# Patient Record
Sex: MALE | Race: WHITE | NOT HISPANIC OR LATINO | Employment: FULL TIME | URBAN - METROPOLITAN AREA
[De-identification: names, ages, dates, MRNs, and addresses within clinical notes are randomized per-mention and may not be internally consistent; named-entity substitution may affect disease eponyms.]

---
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
## 2022-01-16 ENCOUNTER — HEALTH MAINTENANCE LETTER (OUTPATIENT)
Age: 22
End: 2022-01-16

## 2022-02-07 ENCOUNTER — HOSPITAL ENCOUNTER (EMERGENCY)
Facility: CLINIC | Age: 22
Discharge: HOME | End: 2022-02-07
Attending: EMERGENCY MEDICINE | Admitting: EMERGENCY MEDICINE
Payer: COMMERCIAL

## 2022-02-07 ENCOUNTER — APPOINTMENT (OUTPATIENT)
Dept: CT IMAGING | Facility: CLINIC | Age: 22
End: 2022-02-07
Attending: EMERGENCY MEDICINE
Payer: COMMERCIAL

## 2022-02-07 VITALS
RESPIRATION RATE: 16 BRPM | BODY MASS INDEX: 16.92 KG/M2 | WEIGHT: 108 LBS | SYSTOLIC BLOOD PRESSURE: 116 MMHG | OXYGEN SATURATION: 97 % | HEART RATE: 60 BPM | TEMPERATURE: 98.4 F | DIASTOLIC BLOOD PRESSURE: 72 MMHG

## 2022-02-07 DIAGNOSIS — S16.1XXA STRAIN OF NECK MUSCLE, INITIAL ENCOUNTER: ICD-10-CM
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
## 2022-02-07 PROCEDURE — 250N000011 HC RX IP 250 OP 636: Performed by: EMERGENCY MEDICINE

## 2022-02-07 PROCEDURE — 99285 EMERGENCY DEPT VISIT HI MDM: CPT | Performed by: EMERGENCY MEDICINE

## 2022-02-07 PROCEDURE — 72125 CT NECK SPINE W/O DYE: CPT

## 2022-02-07 PROCEDURE — 96374 THER/PROPH/DIAG INJ IV PUSH: CPT | Performed by: EMERGENCY MEDICINE

## 2022-02-07 PROCEDURE — 96376 TX/PRO/DX INJ SAME DRUG ADON: CPT | Performed by: EMERGENCY MEDICINE

## 2022-02-07 PROCEDURE — 96375 TX/PRO/DX INJ NEW DRUG ADDON: CPT | Performed by: EMERGENCY MEDICINE

## 2022-02-07 PROCEDURE — 99285 EMERGENCY DEPT VISIT HI MDM: CPT | Mod: 25 | Performed by: EMERGENCY MEDICINE

## 2022-02-07 RX ORDER — LORAZEPAM 2 MG/ML
0.5 INJECTION INTRAMUSCULAR ONCE
Status: COMPLETED | OUTPATIENT
Start: 2022-02-07 | End: 2022-02-07

## 2022-02-07 RX ORDER — HYDROCODONE BITARTRATE AND ACETAMINOPHEN 5; 325 MG/1; MG/1
1 TABLET ORAL EVERY 6 HOURS PRN
Qty: 10 TABLET | Refills: 0 | Status: SHIPPED | OUTPATIENT
Start: 2022-02-07 | End: 2022-02-10

## 2022-02-07 RX ORDER — HYDROMORPHONE HYDROCHLORIDE 1 MG/ML
0.5 INJECTION, SOLUTION INTRAMUSCULAR; INTRAVENOUS; SUBCUTANEOUS ONCE
Status: COMPLETED | OUTPATIENT
Start: 2022-02-07 | End: 2022-02-07

## 2022-02-07 RX ORDER — KETOROLAC TROMETHAMINE 15 MG/ML
15 INJECTION, SOLUTION INTRAMUSCULAR; INTRAVENOUS ONCE
Status: COMPLETED | OUTPATIENT
Start: 2022-02-07 | End: 2022-02-07

## 2022-02-07 RX ORDER — CYCLOBENZAPRINE HCL 10 MG
5-10 TABLET ORAL 3 TIMES DAILY PRN
Qty: 30 TABLET | Refills: 0 | Status: SHIPPED | OUTPATIENT
Start: 2022-02-07 | End: 2022-09-15

## 2022-02-07 RX ORDER — IBUPROFEN 200 MG
600 TABLET ORAL DAILY
COMMUNITY
End: 2022-09-15

## 2022-02-07 RX ORDER — ACETAMINOPHEN 500 MG
1000 TABLET ORAL 3 TIMES DAILY PRN
COMMUNITY

## 2022-02-07 RX ADMIN — LORAZEPAM 0.5 MG: 2 INJECTION INTRAMUSCULAR; INTRAVENOUS at 18:09

## 2022-02-07 RX ADMIN — KETOROLAC TROMETHAMINE 15 MG: 15 INJECTION, SOLUTION INTRAMUSCULAR; INTRAVENOUS at 17:37

## 2022-02-07 RX ADMIN — LORAZEPAM 0.5 MG: 2 INJECTION INTRAMUSCULAR; INTRAVENOUS at 17:39

## 2022-02-07 RX ADMIN — HYDROMORPHONE HYDROCHLORIDE 0.5 MG: 1 INJECTION, SOLUTION INTRAMUSCULAR; INTRAVENOUS; SUBCUTANEOUS at 18:07
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
## 2022-02-07 NOTE — ED TRIAGE NOTES
Stiffneck collar applied at triage.  Tingling to fingers on left hand before and after application.
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
## 2022-02-07 NOTE — LETTER
February 7, 2022      To Whom It May Concern:      Cathi Gu was seen in our Emergency Department today, 02/07/22.  I expect his condition to improve over the next 2-3 days.  He may return to work/school when improved.    Sincerely,        Coreen Gaona MD

## 2022-02-08 NOTE — DISCHARGE INSTRUCTIONS
CT scan was normal.  This means there are no broken bones.    Recommend rest, ice or heat to the painful area.    Ibuprofen or Aleve for pain and inflammation.    Flexeril if needed for muscle spasm.  No driving while on this medication.    I will give you a small amount of pain medications to use if needed for severe pain.  It can cause constipation so take stool softener as needed.  No driving while on this medication.    Follow-up in clinic for recheck later in the week.  If it is not improving you may need some physical therapy.  Return for significant worsening or changes or concerns.    I hope you heal quickly!!
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
## 2022-03-08 ENCOUNTER — OFFICE VISIT (OUTPATIENT)
Dept: FAMILY MEDICINE | Facility: CLINIC | Age: 22
End: 2022-03-08
Payer: COMMERCIAL

## 2022-03-08 VITALS
DIASTOLIC BLOOD PRESSURE: 60 MMHG | WEIGHT: 118.25 LBS | HEIGHT: 66 IN | HEART RATE: 120 BPM | BODY MASS INDEX: 19.01 KG/M2 | RESPIRATION RATE: 18 BRPM | OXYGEN SATURATION: 99 % | SYSTOLIC BLOOD PRESSURE: 110 MMHG | TEMPERATURE: 98.8 F

## 2022-03-08 DIAGNOSIS — M54.2 ACUTE NECK PAIN: ICD-10-CM

## 2022-03-08 DIAGNOSIS — M54.6 ACUTE BILATERAL THORACIC BACK PAIN: ICD-10-CM

## 2022-03-08 DIAGNOSIS — M54.42 ACUTE BILATERAL LOW BACK PAIN WITH BILATERAL SCIATICA: Primary | ICD-10-CM

## 2022-03-08 DIAGNOSIS — M54.41 ACUTE BILATERAL LOW BACK PAIN WITH BILATERAL SCIATICA: Primary | ICD-10-CM

## 2022-03-08 PROCEDURE — 99214 OFFICE O/P EST MOD 30 MIN: CPT | Performed by: PHYSICIAN ASSISTANT

## 2022-03-08 PROCEDURE — 96127 BRIEF EMOTIONAL/BEHAV ASSMT: CPT | Performed by: PHYSICIAN ASSISTANT

## 2022-03-08 RX ORDER — CYCLOBENZAPRINE HCL 10 MG
10 TABLET ORAL 3 TIMES DAILY PRN
Qty: 20 TABLET | Refills: 0 | Status: SHIPPED | OUTPATIENT
Start: 2022-03-08 | End: 2022-09-15

## 2022-03-08 RX ORDER — PREDNISONE 20 MG/1
40 TABLET ORAL DAILY
Qty: 10 TABLET | Refills: 0 | Status: SHIPPED | OUTPATIENT
Start: 2022-03-08 | End: 2022-03-13

## 2022-03-08 ASSESSMENT — ANXIETY QUESTIONNAIRES
6. BECOMING EASILY ANNOYED OR IRRITABLE: NOT AT ALL
7. FEELING AFRAID AS IF SOMETHING AWFUL MIGHT HAPPEN: NOT AT ALL
GAD7 TOTAL SCORE: 3
4. TROUBLE RELAXING: SEVERAL DAYS
5. BEING SO RESTLESS THAT IT IS HARD TO SIT STILL: NOT AT ALL
GAD7 TOTAL SCORE: 3
1. FEELING NERVOUS, ANXIOUS, OR ON EDGE: NOT AT ALL
7. FEELING AFRAID AS IF SOMETHING AWFUL MIGHT HAPPEN: NOT AT ALL
GAD7 TOTAL SCORE: 3
3. WORRYING TOO MUCH ABOUT DIFFERENT THINGS: SEVERAL DAYS
2. NOT BEING ABLE TO STOP OR CONTROL WORRYING: SEVERAL DAYS

## 2022-03-08 ASSESSMENT — PATIENT HEALTH QUESTIONNAIRE - PHQ9
SUM OF ALL RESPONSES TO PHQ QUESTIONS 1-9: 1
SUM OF ALL RESPONSES TO PHQ QUESTIONS 1-9: 1
10. IF YOU CHECKED OFF ANY PROBLEMS, HOW DIFFICULT HAVE THESE PROBLEMS MADE IT FOR YOU TO DO YOUR WORK, TAKE CARE OF THINGS AT HOME, OR GET ALONG WITH OTHER PEOPLE: SOMEWHAT DIFFICULT

## 2022-03-08 ASSESSMENT — PAIN SCALES - GENERAL: PAINLEVEL: EXTREME PAIN (9)

## 2022-03-08 NOTE — PATIENT INSTRUCTIONS
Range of motion exercises discussed  Physical therapy referral order placed  Take muscle relaxant every 8 hours as needed for pain/muscle spasms.   CAUTION: Muscle relaxants are sedating. Do not take before work or driving a vehicle.  Take anti-inflammatory medication, such as naproxen or ibuprofen to help with inflammation.   For acute pain, rest, intermittent application of heat (do not sleep on heating pad) and ice for pain reduction.  Do not rest for more than 48 hours after injury/onset of pain.  Gentle stretching and light activity is important.   Stretch back with range of motion exercises.  Proper lifting with avoidance of heavy lifting discussed.   Follow up with primary care provider if these symptoms worsen or fail to improve as anticipated.
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
## 2022-03-09 ASSESSMENT — PATIENT HEALTH QUESTIONNAIRE - PHQ9: SUM OF ALL RESPONSES TO PHQ QUESTIONS 1-9: 1

## 2022-03-09 ASSESSMENT — ANXIETY QUESTIONNAIRES: GAD7 TOTAL SCORE: 3

## 2022-04-26 ENCOUNTER — HOSPITAL ENCOUNTER (EMERGENCY)
Facility: CLINIC | Age: 22
Discharge: HOME | End: 2022-04-26
Attending: EMERGENCY MEDICINE | Admitting: EMERGENCY MEDICINE

## 2022-04-26 VITALS
WEIGHT: 115 LBS | HEART RATE: 60 BPM | RESPIRATION RATE: 19 BRPM | DIASTOLIC BLOOD PRESSURE: 62 MMHG | TEMPERATURE: 97.5 F | OXYGEN SATURATION: 99 % | SYSTOLIC BLOOD PRESSURE: 103 MMHG | BODY MASS INDEX: 18.56 KG/M2

## 2022-04-26 DIAGNOSIS — M54.50 ACUTE BILATERAL LOW BACK PAIN WITHOUT SCIATICA: ICD-10-CM

## 2022-04-26 PROCEDURE — 99284 EMERGENCY DEPT VISIT MOD MDM: CPT | Performed by: EMERGENCY MEDICINE

## 2022-04-26 RX ORDER — OXYCODONE HYDROCHLORIDE 5 MG/1
5 TABLET ORAL EVERY 6 HOURS PRN
Qty: 12 TABLET | Refills: 0 | Status: SHIPPED | OUTPATIENT
Start: 2022-04-26 | End: 2022-04-29

## 2022-04-26 RX ORDER — TIZANIDINE 2 MG/1
2 TABLET ORAL 3 TIMES DAILY PRN
Qty: 15 TABLET | Refills: 0 | Status: SHIPPED | OUTPATIENT
Start: 2022-04-26 | End: 2022-09-15

## 2022-04-26 RX ORDER — ONDANSETRON 4 MG/1
4 TABLET, ORALLY DISINTEGRATING ORAL EVERY 6 HOURS PRN
Qty: 15 TABLET | Refills: 0 | Status: SHIPPED | OUTPATIENT
Start: 2022-04-26 | End: 2022-04-29

## 2022-04-26 NOTE — LETTER
April 26, 2022      To Whom It May Concern:      Cathi Gu was seen in our Emergency Department today, 04/26/22.  I expect his condition to improve over the next 7 days.  He may return to work when improved but must not do any heavy lifting until back pain resolved.    Sincerely,        Maurice Schaeffer MD

## 2022-04-26 NOTE — ED TRIAGE NOTES
Patient states he injured his back while lifting at work on Friday. He is also c/o nausea and vomiting that started after eating [employer] today. Color is pale.      Triage Assessment     Row Name 04/26/22 1848       Triage Assessment (Adult)    Airway WDL WDL       Skin Circulation/Temperature WDL    Skin Circulation/Temperature WDL X;all    Skin Circulation pallor    Skin Temperature cool

## 2022-04-27 ENCOUNTER — PATIENT OUTREACH (OUTPATIENT)
Dept: CARE COORDINATION | Facility: CLINIC | Age: 22
End: 2022-04-27
Payer: COMMERCIAL

## 2022-04-27 DIAGNOSIS — Z71.89 OTHER SPECIFIED COUNSELING: ICD-10-CM

## 2022-04-27 NOTE — PROGRESS NOTES
Clinic Care Coordination Contact  [facility]/Voicemail       Clinical Data: Care Coordinator Outreach  Outreach attempted x 1.  Left message on patient's voicemail with call back information and requested return call.  Plan: Care Coordinator will try to reach patient again in 1-2 business days.    .Kimmie OVIEDO Community Health Worker  Clinic Care Coordination  [facility]  Phone: [phone]

## 2022-04-27 NOTE — DISCHARGE INSTRUCTIONS
Return to the emergency department if pain out-of-control or other new symptoms develop such as numbness down the legs.  Do not lift anything heavy for the next several days to a week.  Return to activity as tolerated as your pain improves.  Do the stretching exercises I recommended.  I hope you get better quickly.  No driving if taking the pain pills.

## 2022-04-27 NOTE — ED PROVIDER NOTES
History     Chief Complaint   Patient presents with     Back Pain     HPI  Cathi Gu is a 21 year old male who presents to the emergency department secondary to back pain, nausea and vomiting.  He has a history of von Willebrand's disease.  He developed the back pain a few days ago after lifting a heavy piece of metal.  The pain is located in the bilateral paralumbar musculature.  It does not radiate.  It makes it worse to twist and turn and bend at the waist.  There is no high-speed accident.  His back seems to be in muscle spasm.  No numbness or tingling.  He also developed nausea and vomiting today that he feels was related to the pain but may be related to the [employer] he ate earlier today.  He does a lot of lifting at work and he is unable to tolerate this with the pain.  He has tried Tylenol and ibuprofen.  He has not been able to get relief.  He tried to go back to work but had difficulty because of the requirement to do lifting at work.    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 positive 09/02/2016     Priority: Medium     NSAID long-term use 09/02/2016     Priority: Medium     DAVIAN (generalized anxiety disorder) 05/03/2016     Priority: Medium     Adjustment disorder with depressed mood 05/03/2016     Priority: Medium     Intermittent asthma, uncomplicated 03/11/2016     Priority: Medium     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     Priority:  Medium     Seasonal allergic rhinitis 10/29/2013     Priority: Medium     Learning disability 2011     Priority: Medium        Past Medical History:    Past Medical History:   Diagnosis Date     Asthma, intermittent      Cyclical vomiting age 8y     Learning disability      Von Willebrand disease (H)        Past Surgical History:    Past Surgical History:   Procedure Laterality Date     BIOPSY OF SKIN LESION      mole removal     ESOPHAGOSCOPY, GASTROSCOPY, DUODENOSCOPY (EGD), COMBINED  2014    Procedure: COMBINED ESOPHAGOSCOPY, GASTROSCOPY, DUODENOSCOPY (EGD), BIOPSY SINGLE OR MULTIPLE;  EGD, vomiting;  Surgeon: Leo Chapman MD;  Location: [facility] OR     MYRINGOTOMY, INSERT TUBE BILATERAL, COMBINED Bilateral 2018    Procedure: COMBINED MYRINGOTOMY, INSERT TUBE BILATERAL;  Bilateral myringotomy with tubes;  Surgeon: Rick Watson MD;  Location: [facility] OR     PE tubes in B ears x 2      tubes out     pyloric stenosis         Family History:    Family History   Problem Relation Age of Onset     Bipolar Disorder Other      Schizophrenia Other      Bipolar Disorder Maternal Aunt      Bipolar Disorder Maternal Grandmother      Diabetes Other         Maternal great grandfather     Other - See Comments Other         Lupus-- paternal side half brothers     Other - See Comments Other         paternal side half brother,  congenital syndrome at age 1y     Other - See Comments Mother         mother 5 ft 1in with menarche at age 15 years;  mother is adopted, her biological parents were related (parent-child)/Concern for genetic syndrome, never worked up fully. Born with 3 toes on each foot.     Other - See Comments Father         unsure height, 5 feet 7 in est     Ulcerative Colitis Other         Maternal great grandmother       Social History:  Marital Status:  Single [1]  Social History     Tobacco Use     Smoking status: Current Some Day Smoker     Packs/day: 0.25     Smokeless tobacco: Current User      Types: Chew   Vaping Use     Vaping Use: Every day     Substances: Nicotine, Flavoring     Devices: Pre-filled pod   Substance Use Topics     Alcohol use: Yes     Alcohol/week: 0.0 standard drinks     Comment: occ     Drug use: Yes     Types: Marijuana        Medications:    ondansetron (ZOFRAN ODT) 4 MG ODT tab  oxyCODONE (ROXICODONE) 5 MG tablet  tiZANidine (ZANAFLEX) 2 MG tablet  acetaminophen (TYLENOL) 500 MG tablet  albuterol (PROAIR HFA/PROVENTIL HFA/VENTOLIN HFA) 108 (90 Base) MCG/ACT inhaler  cyclobenzaprine (FLEXERIL) 10 MG tablet  cyclobenzaprine (FLEXERIL) 10 MG tablet  fluticasone (FLONASE) 50 MCG/ACT nasal spray  ibuprofen (ADVIL/MOTRIN) 200 MG tablet          Review of Systems   All other systems reviewed and are negative.      Physical Exam   BP: (!) 137/104  Pulse: 51  Temp: 97.5  F (36.4  C)  Resp: 22  Weight: 52.2 kg (115 lb)  SpO2: 97 %      Physical Exam  Vitals and nursing note reviewed.   Constitutional:       General: He is not in acute distress.     Appearance: He is well-developed. He is not diaphoretic.   HENT:      Head: Normocephalic and atraumatic.      Right Ear: External ear normal.      Left Ear: External ear normal.      Nose: Nose normal.      Mouth/Throat:      Mouth: Mucous membranes are moist.   Eyes:      General: No scleral icterus.  Cardiovascular:      Rate and Rhythm: Normal rate.   Pulmonary:      Effort: Pulmonary effort is normal.   Musculoskeletal:      Cervical back: Normal range of motion and neck supple.      Comments: He is able to sit up at the waist.  He has tenderness to palpation over the paraspinal musculature in the lumbar spine area.  Minimal tenderness over the midline lumbar spine.  Pain is provoked by twisting turning and straight leg raise.  Normal sensation in extremities.  There is limited range of motion at the waist secondary to pain.   Skin:     General: Skin is warm and dry.      Findings: No rash.   Neurological:      Mental Status: He is alert and  oriented to person, place, and time.   Psychiatric:         Mood and Affect: Mood normal.         ED Course                 Procedures                  No results found for this or any previous visit (from the past 24 hour(s)).    Medications - No data to display    Assessments & Plan (with Medical Decision Making)  Low back pain, muscular.  No evidence for radicular symptoms or lumbar disc disease.  There is no high mechanism of injury that required imaging.  The patient is not able to tolerate working lifting and therefore a work note was given.  I given the option to do IV medications for his pain, muscle spasm and vomiting.  I am not sure if his vomiting is related to the pain or secondary to the meal that he ate.  He had 3 episodes of vomiting total.  Given his options he wanted to go home with prescriptions rather than receive an intramuscular shot.  Prescriptions were sent to the pharmacy for Zofran, tizanidine, oxycodone.  Patient advised not to drive on those medications.  Return to ER precautions of fall precautions discussed.  All questions answered prior to discharge.     I have reviewed the nursing notes.    I have reviewed the findings, diagnosis, plan and need for follow up with the patient.      New Prescriptions    ONDANSETRON (ZOFRAN ODT) 4 MG ODT TAB    Take 1 tablet (4 mg) by mouth every 6 hours as needed    OXYCODONE (ROXICODONE) 5 MG TABLET    Take 1 tablet (5 mg) by mouth every 6 hours as needed for pain    TIZANIDINE (ZANAFLEX) 2 MG TABLET    Take 1 tablet (2 mg) by mouth 3 times daily as needed for muscle spasms       Final diagnoses:   Acute bilateral low back pain without sciatica       4/26/2022   [facility] EMERGENCY DEPT     Maurice Schaeffer MD  04/26/22 1933

## 2022-06-08 ENCOUNTER — OFFICE VISIT (OUTPATIENT)
Dept: PEDIATRICS | Facility: CLINIC | Age: 22
End: 2022-06-08
Payer: COMMERCIAL

## 2022-06-08 VITALS
SYSTOLIC BLOOD PRESSURE: 116 MMHG | RESPIRATION RATE: 16 BRPM | DIASTOLIC BLOOD PRESSURE: 80 MMHG | TEMPERATURE: 97.6 F | HEIGHT: 66 IN | WEIGHT: 113.8 LBS | HEART RATE: 66 BPM | OXYGEN SATURATION: 100 % | BODY MASS INDEX: 18.29 KG/M2

## 2022-06-08 DIAGNOSIS — Z11.3 SCREENING EXAMINATION FOR STD (SEXUALLY TRANSMITTED DISEASE): Primary | ICD-10-CM

## 2022-06-08 PROCEDURE — 86780 TREPONEMA PALLIDUM: CPT | Performed by: PEDIATRICS

## 2022-06-08 PROCEDURE — 87491 CHLMYD TRACH DNA AMP PROBE: CPT | Performed by: PEDIATRICS

## 2022-06-08 PROCEDURE — 36415 COLL VENOUS BLD VENIPUNCTURE: CPT | Performed by: PEDIATRICS

## 2022-06-08 PROCEDURE — 87389 HIV-1 AG W/HIV-1&-2 AB AG IA: CPT | Performed by: PEDIATRICS

## 2022-06-08 PROCEDURE — 87591 N.GONORRHOEAE DNA AMP PROB: CPT | Performed by: PEDIATRICS

## 2022-06-08 PROCEDURE — 99213 OFFICE O/P EST LOW 20 MIN: CPT | Performed by: PEDIATRICS

## 2022-06-08 ASSESSMENT — ASTHMA QUESTIONNAIRES: ACT_TOTALSCORE: 22

## 2022-06-08 ASSESSMENT — PAIN SCALES - GENERAL: PAINLEVEL: NO PAIN (0)

## 2022-06-08 NOTE — PROGRESS NOTES
"  Assessment & Plan     (Z11.3) Screening examination for STD (sexually transmitted disease)  (primary encounter diagnosis)  Plan: HIV Antigen Antibody Combo, Treponema Abs w         Reflex to [facility]PR and Titer, Chlamydia & Gonorrhea         by PCR, [facility] - Clinic Collect                   Tobacco Cessation:   reports that he has been smoking. He has been smoking about 1.00 pack per day. His smokeless tobacco use includes chew.      Muna Lara MD  [facility]   Cathi is a 21 year old who presents for the following health issues     History of Present Illness       Reason for visit:  Std testing    He eats 0-1 servings of fruits and vegetables daily.He consumes 4 sweetened beverage(s) daily.He exercises with enough effort to increase his heart rate 60 or more minutes per day.  He exercises with enough effort to increase his heart rate 5 days per week.   He is taking medications regularly.       Has about 5-6 female partners in his lifetime, occ condom use, has never gotten tested before, denies any symptoms - denies any rashes, no lesions on penis, no discharge, no joint pain, no pain with urination or with sex, last sexual encounter was today          Objective    /80   Pulse 66   Temp 97.6  F (36.4  C) (Tympanic)   Resp 16   Ht 5' 5.59\" (1.666 m)   Wt 113 lb 12.8 oz (51.6 kg)   SpO2 100%   BMI 18.60 kg/m    Body mass index is 18.6 kg/m .  Physical Exam   GENERAL: healthy, alert and no distress   (male): normal male genitalia without lesions or urethral discharge, no hernia            "

## 2022-06-09 LAB
C TRACH DNA SPEC QL PROBE+SIG AMP: NEGATIVE
HIV 1+2 AB+HIV1 P24 AG SERPL QL IA: NONREACTIVE
N GONORRHOEA DNA SPEC QL NAA+PROBE: NEGATIVE
T PALLIDUM AB SER QL: NONREACTIVE

## 2022-06-29 ENCOUNTER — TELEPHONE (OUTPATIENT)
Dept: PEDIATRICS | Facility: CLINIC | Age: 22
End: 2022-06-29

## 2022-06-29 NOTE — TELEPHONE ENCOUNTER
Patient Quality Outreach    Patient is due for the following:   Asthma  -  AAP  Physical  - due now   Immunizations  -  Covid and HPV    NEXT STEPS:   Schedule a yearly physical    Type of outreach:    Sent Joost message.      Questions for provider review:    None     Akila Carrillo MA

## 2022-07-09 NOTE — PATIENT INSTRUCTIONS
Please move hand for circulatory flow. May use ibuprofen or tylenol for discomfort. I will call with final read of xray    Thank you  Yocasta Go CNP    
LABS:                        13.6   10.94 )-----------( 260      ( 09 Jul 2022 11:39 )             41.2     07-09    137  |  100  |  26<H>  ----------------------------<  138<H>  4.0   |  24  |  0.99    Ca    10.1      09 Jul 2022 11:39    TPro  6.7  /  Alb  4.2  /  TBili  0.3  /  DBili  x   /  AST  30  /  ALT  26  /  AlkPhos  96  07-09    PT/INR - ( 09 Jul 2022 11:39 )   PT: 11.6 sec;   INR: 1.00 ratio         PTT - ( 09 Jul 2022 11:39 )  PTT:25.5 sec          RADIOLOGY & ADDITIONAL TESTS:

## 2022-09-15 ENCOUNTER — OFFICE VISIT (OUTPATIENT)
Dept: FAMILY MEDICINE | Facility: CLINIC | Age: 22
End: 2022-09-15
Payer: COMMERCIAL

## 2022-09-15 VITALS
WEIGHT: 115 LBS | HEIGHT: 66 IN | BODY MASS INDEX: 18.48 KG/M2 | RESPIRATION RATE: 18 BRPM | HEART RATE: 70 BPM | SYSTOLIC BLOOD PRESSURE: 100 MMHG | DIASTOLIC BLOOD PRESSURE: 60 MMHG | TEMPERATURE: 98.3 F

## 2022-09-15 DIAGNOSIS — H92.12 OTORRHEA, LEFT: Primary | ICD-10-CM

## 2022-09-15 DIAGNOSIS — J45.21 MILD INTERMITTENT ASTHMA WITH ACUTE EXACERBATION: ICD-10-CM

## 2022-09-15 DIAGNOSIS — F33.0 MILD RECURRENT MAJOR DEPRESSION (H): ICD-10-CM

## 2022-09-15 DIAGNOSIS — D68.00 VON WILLEBRAND'S DISEASE (H): ICD-10-CM

## 2022-09-15 PROCEDURE — 99214 OFFICE O/P EST MOD 30 MIN: CPT | Performed by: FAMILY MEDICINE

## 2022-09-15 RX ORDER — CEFDINIR 300 MG/1
300 CAPSULE ORAL 2 TIMES DAILY
Qty: 20 CAPSULE | Refills: 0 | Status: SHIPPED | OUTPATIENT
Start: 2022-09-15 | End: 2022-09-25

## 2022-09-15 RX ORDER — ALBUTEROL SULFATE 90 UG/1
2 AEROSOL, METERED RESPIRATORY (INHALATION) EVERY 4 HOURS PRN
Qty: 18 G | Refills: 3 | Status: SHIPPED | OUTPATIENT
Start: 2022-09-15

## 2022-09-15 ASSESSMENT — ASTHMA QUESTIONNAIRES
ACT_TOTALSCORE: 15
QUESTION_2 LAST FOUR WEEKS HOW OFTEN HAVE YOU HAD SHORTNESS OF BREATH: THREE TO SIX TIMES A WEEK
QUESTION_3 LAST FOUR WEEKS HOW OFTEN DID YOUR ASTHMA SYMPTOMS (WHEEZING, COUGHING, SHORTNESS OF BREATH, CHEST TIGHTNESS OR PAIN) WAKE YOU UP AT NIGHT OR EARLIER THAN USUAL IN THE MORNING: TWO OR THREE NIGHTS A WEEK
ACT_TOTALSCORE: 15
QUESTION_5 LAST FOUR WEEKS HOW WOULD YOU RATE YOUR ASTHMA CONTROL: SOMEWHAT CONTROLLED
QUESTION_1 LAST FOUR WEEKS HOW MUCH OF THE TIME DID YOUR ASTHMA KEEP YOU FROM GETTING AS MUCH DONE AT WORK, SCHOOL OR AT HOME: A LITTLE OF THE TIME
QUESTION_4 LAST FOUR WEEKS HOW OFTEN HAVE YOU USED YOUR RESCUE INHALER OR NEBULIZER MEDICATION (SUCH AS ALBUTEROL): TWO OR THREE TIMES PER WEEK

## 2022-09-15 ASSESSMENT — PATIENT HEALTH QUESTIONNAIRE - PHQ9
SUM OF ALL RESPONSES TO PHQ QUESTIONS 1-9: 6
10. IF YOU CHECKED OFF ANY PROBLEMS, HOW DIFFICULT HAVE THESE PROBLEMS MADE IT FOR YOU TO DO YOUR WORK, TAKE CARE OF THINGS AT HOME, OR GET ALONG WITH OTHER PEOPLE: SOMEWHAT DIFFICULT
SUM OF ALL RESPONSES TO PHQ QUESTIONS 1-9: 6

## 2022-09-15 ASSESSMENT — ANXIETY QUESTIONNAIRES
1. FEELING NERVOUS, ANXIOUS, OR ON EDGE: SEVERAL DAYS
7. FEELING AFRAID AS IF SOMETHING AWFUL MIGHT HAPPEN: NOT AT ALL
GAD7 TOTAL SCORE: 2
4. TROUBLE RELAXING: NOT AT ALL
5. BEING SO RESTLESS THAT IT IS HARD TO SIT STILL: NOT AT ALL
3. WORRYING TOO MUCH ABOUT DIFFERENT THINGS: NOT AT ALL
GAD7 TOTAL SCORE: 2
7. FEELING AFRAID AS IF SOMETHING AWFUL MIGHT HAPPEN: NOT AT ALL
8. IF YOU CHECKED OFF ANY PROBLEMS, HOW DIFFICULT HAVE THESE MADE IT FOR YOU TO DO YOUR WORK, TAKE CARE OF THINGS AT HOME, OR GET ALONG WITH OTHER PEOPLE?: SOMEWHAT DIFFICULT
GAD7 TOTAL SCORE: 2
6. BECOMING EASILY ANNOYED OR IRRITABLE: SEVERAL DAYS
IF YOU CHECKED OFF ANY PROBLEMS ON THIS QUESTIONNAIRE, HOW DIFFICULT HAVE THESE PROBLEMS MADE IT FOR YOU TO DO YOUR WORK, TAKE CARE OF THINGS AT HOME, OR GET ALONG WITH OTHER PEOPLE: SOMEWHAT DIFFICULT
2. NOT BEING ABLE TO STOP OR CONTROL WORRYING: NOT AT ALL

## 2022-09-15 ASSESSMENT — PAIN SCALES - GENERAL: PAINLEVEL: NO PAIN (0)

## 2022-09-15 NOTE — NURSING NOTE
"Chief Complaint   Patient presents with     Ear Problem     /60 (Cuff Size: Adult Regular)   Pulse 70   Temp 98.3  F (36.8  C) (Tympanic)   Resp 18   Ht 1.666 m (5' 5.59\")   Wt 52.2 kg (115 lb)   BMI 18.79 kg/m   Estimated body mass index is 18.79 kg/m  as calculated from the following:    Height as of this encounter: 1.666 m (5' 5.59\").    Weight as of this encounter: 52.2 kg (115 lb).  Patient presents to the clinic using No DME      Health Maintenance that is potentially due pending provider review:    Health Maintenance Due   Topic Date Due     NICOTINE/TOBACCO CESSATION COUNSELING Q 1 YR  Never done     ANNUAL REVIEW OF HM ORDERS  Never done     COVID-19 Vaccine (1) Never done     Pneumococcal Vaccine: Pediatrics (0 to 5 Years) and At-Risk Patients (6 to 64 Years) (1 - PCV) 09/08/2008     HPV IMMUNIZATION (1 - Male 2-dose series) Never done     PREVENTIVE CARE VISIT  02/18/2020     HEPATITIS C SCREENING  Never done     ASTHMA ACTION PLAN  01/21/2021     INFLUENZA VACCINE (1) 09/01/2022                "

## 2022-09-15 NOTE — PROGRESS NOTES
Assessment & Plan     Otorrhea, left  Differential discussed in detail including left-sided otitis media.  Omnicef prescribed, common side effects discussed.  Recommended well hydration, warm fluids, over-the-counter antihistamine and to follow-up in 2 weeks or earlier if needed.  - cefdinir (OMNICEF) 300 MG capsule; Take 1 capsule (300 mg) by mouth 2 times daily for 10 days    Mild intermittent asthma with acute exacerbation  Albuterol inhaler refilled  - albuterol (PROAIR HFA/PROVENTIL HFA/VENTOLIN HFA) 108 (90 Base) MCG/ACT inhaler; Inhale 2 puffs into the lungs every 4 hours as needed for shortness of breath / dyspnea or wheezing    Mild recurrent major depression (H)  Symptoms stable.  Recommended counseling  - Adult Mental Health  Referral; Future    Von Willebrand's disease (H)  History of von Willebrand disease.  No excessive bleeding episodes in the recent past      Nicotine/Tobacco Cessation:  He reports that he has been smoking. He has been smoking about 1.00 pack per day. His smokeless tobacco use includes chew.  Nicotine/Tobacco Cessation Plan:   Information offered: Patient not interested at this time      Sanford Denton MD  [facility]    Tabatha Winkler is a 22 year old, presenting for the following health issues:  Ear Problem      History of Present Illness       Reason for visit:  My ears    He eats 0-1 servings of fruits and vegetables daily.He consumes 2 sweetened beverage(s) daily.He exercises with enough effort to increase his heart rate 9 or less minutes per day.  He exercises with enough effort to increase his heart rate 3 or less days per week.   He is taking medications regularly.    Today's PHQ-9         PHQ-9 Total Score: 6    PHQ-9 Q9 Thoughts of better off dead/self-harm past 2 weeks :   Not at all    How difficult have these problems made it for you to do your work, take care of things at home, or get along with other people: Somewhat  "difficult  Today's DAVIAN-7 Score: 2       Left ear. Had tubes 3-4 years ago. Feels full of fluid.         Review of Systems   Constitutional, HEENT, cardiovascular, pulmonary, GI, , musculoskeletal, neuro, skin, endocrine and psych systems are negative, except as otherwise noted.      Objective    /60 (Cuff Size: Adult Regular)   Pulse 70   Temp 98.3  F (36.8  C) (Tympanic)   Resp 18   Ht 1.666 m (5' 5.59\")   Wt 52.2 kg (115 lb)   BMI 18.79 kg/m    Body mass index is 18.79 kg/m .  Physical Exam   GENERAL: healthy, alert and no distress  EYES: Eyes grossly normal to inspection, PERRL and conjunctivae and sclerae normal  HENT: normal cephalic/atraumatic, right ear: normal: no effusions, no erythema, normal landmarks, left ear: clear effusion and bulging membrane, nose and mouth without ulcers or lesions, oropharynx clear and oral mucous membranes moist  NECK: no adenopathy, no asymmetry, masses, or scars and thyroid normal to palpation  RESP: lungs clear to auscultation - no rales, rhonchi or wheezes  CV: regular rates and rhythm, normal S1 S2, no S3 or S4 and no murmur, click or rub  MS: no gross musculoskeletal defects noted, no edema  SKIN: no suspicious lesions or rashes  NEURO: Normal strength and tone, mentation intact and speech normal  PSYCH: mentation appears normal, anxious, judgement and insight intact and appearance well groomed                "

## 2022-11-16 ENCOUNTER — HOSPITAL ENCOUNTER (EMERGENCY)
Facility: CLINIC | Age: 22
Discharge: HOME | End: 2022-11-16
Attending: FAMILY MEDICINE | Admitting: FAMILY MEDICINE
Payer: COMMERCIAL

## 2022-11-16 VITALS
BODY MASS INDEX: 18.79 KG/M2 | DIASTOLIC BLOOD PRESSURE: 78 MMHG | HEART RATE: 78 BPM | WEIGHT: 115 LBS | TEMPERATURE: 98 F | RESPIRATION RATE: 18 BRPM | SYSTOLIC BLOOD PRESSURE: 120 MMHG | OXYGEN SATURATION: 100 %

## 2022-11-16 DIAGNOSIS — K13.79 MOUTH SORES: ICD-10-CM

## 2022-11-16 DIAGNOSIS — K04.7 DENTAL INFECTION: ICD-10-CM

## 2022-11-16 PROCEDURE — 99284 EMERGENCY DEPT VISIT MOD MDM: CPT | Performed by: FAMILY MEDICINE

## 2022-11-16 RX ORDER — HYDROCODONE BITARTRATE AND ACETAMINOPHEN 5; 325 MG/1; MG/1
1 TABLET ORAL EVERY 6 HOURS PRN
Qty: 6 TABLET | Refills: 0 | Status: SHIPPED | OUTPATIENT
Start: 2022-11-16 | End: 2022-12-20

## 2022-11-16 RX ORDER — CLINDAMYCIN HCL 300 MG
300 CAPSULE ORAL 4 TIMES DAILY
Qty: 30 CAPSULE | Refills: 0 | Status: SHIPPED | OUTPATIENT
Start: 2022-11-16 | End: 2022-11-21

## 2022-11-17 NOTE — ED TRIAGE NOTES
Patient reports he had some teeth removed last Wednesday and now with sores to bottom lip.      Triage Assessment     Row Name 11/16/22 [id number]       Triage Assessment (Adult)    Airway WDL WDL       Respiratory WDL    Respiratory WDL WDL       Skin Circulation/Temperature WDL    Skin Circulation/Temperature WDL WDL       Cardiac WDL    Cardiac WDL WDL

## 2022-11-17 NOTE — ED PROVIDER NOTES
History     Chief Complaint   Patient presents with     Mouth Problem     HPI  Cathi Gu is a 22 year old male who presents with concerns of sores on his mouth and some pain where his tooth was removed last Wednesday.  Patient is concerned about a possible infection or if the 2 things could be related.  Denies any fevers or chills.  Patient states has been no trauma to his lips.    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        HLA-B27 positive 09/02/2016     Priority: Medium     NSAID long-term use 09/02/2016     Priority: Medium     DAVIAN (generalized anxiety disorder) 05/03/2016     Priority: Medium     Adjustment disorder with depressed mood 05/03/2016     Priority: Medium     Intermittent asthma, uncomplicated 03/11/2016     Priority: Medium     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     Priority: Medium     Seasonal allergic rhinitis 10/29/2013     Priority: Medium     Learning disability 09/28/2011     Priority: Medium        Past Medical History:    Past Medical History:   Diagnosis Date     Asthma, intermittent      Cyclical vomiting age 8y     Learning disability      Von Willebrand disease 2015       Past Surgical History:    Past Surgical History:   Procedure Laterality Date     BIOPSY OF SKIN LESION  2008    mole removal     ESOPHAGOSCOPY, GASTROSCOPY, DUODENOSCOPY (EGD), COMBINED  4/9/2014    Procedure: COMBINED ESOPHAGOSCOPY, GASTROSCOPY, DUODENOSCOPY (EGD),  BIOPSY SINGLE OR MULTIPLE;  EGD, vomiting;  Surgeon: Leo Chapman MD;  Location: [facility] OR     MYRINGOTOMY, INSERT TUBE BILATERAL, COMBINED Bilateral 2018    Procedure: COMBINED MYRINGOTOMY, INSERT TUBE BILATERAL;  Bilateral myringotomy with tubes;  Surgeon: Rick Watson MD;  Location: [facility] OR     PE tubes in B ears x 2      tubes out     pyloric stenosis         Family History:    Family History   Problem Relation Age of Onset     Other - See Comments Mother         mother 5 ft 1in with menarche at age 15 years;  mother is adopted, her biological parents were related (parent-child)/Concern for genetic syndrome, never worked up fully. Born with 3 toes on each foot.     Other - See Comments Father         unsure height, 5 feet 7 in est     Bipolar Disorder Maternal Grandmother      Bipolar Disorder Maternal Aunt      Bipolar Disorder Other      Schizophrenia Other      Diabetes Other         Maternal great grandfather     Other - See Comments Other         Lupus-- paternal side half brothers     Other - See Comments Other         paternal side half brother,  congenital syndrome at age 1y     Ulcerative Colitis Other         Maternal great grandmother       Social History:  Marital Status:  Single [1]  Social History     Tobacco Use     Smoking status: Some Days     Packs/day: 1.00     Types: Cigarettes     Smokeless tobacco: Current     Types: Chew   Vaping Use     Vaping Use: Every day     Substances: Nicotine, Flavoring     Devices: Pre-filled or refillable cartridge, Refillable tank   Substance Use Topics     Alcohol use: Yes     Alcohol/week: 0.0 standard drinks     Comment: occ     Drug use: Yes     Types: Marijuana        Medications:    clindamycin (CLEOCIN) 300 MG capsule  HYDROcodone-acetaminophen (NORCO) 5-325 MG tablet  acetaminophen (TYLENOL) 500 MG tablet  albuterol (PROAIR HFA/PROVENTIL HFA/VENTOLIN HFA) 108 (90 Base) MCG/ACT inhaler  fluticasone (FLONASE) 50 MCG/ACT nasal spray          Review  of Systems   All other systems reviewed and are negative.      Physical Exam   BP: 119/81  Pulse: 84  Temp: 98  F (36.7  C)  Resp: 18  Weight: 52.2 kg (115 lb)  SpO2: 100 %      Physical Exam  Vitals reviewed.   Constitutional:       General: He is not in acute distress.     Appearance: Normal appearance. He is not ill-appearing.   HENT:      Head: Normocephalic and atraumatic.      Right Ear: Tympanic membrane normal.      Left Ear: Tympanic membrane normal.      Nose: Congestion present.      Mouth/Throat:      Mouth: Mucous membranes are moist.      Comments: Multiple aphthous ulcers noted on the upper and lower lips.  Upper teeth where they removed appear to be stable there is no gingival swelling noted.  Eyes:      Pupils: Pupils are equal, round, and reactive to light.   Cardiovascular:      Rate and Rhythm: Normal rate.      Pulses: Normal pulses.   Pulmonary:      Effort: Pulmonary effort is normal.   Musculoskeletal:         General: Normal range of motion.   Neurological:      Mental Status: He is alert.         ED Course                 Procedures      No results found for this or any previous visit (from the past 24 hour(s)).    Medications - No data to display     Patient appears to have multiple aphthous ulcers on the lips.  This appears to be likely from trauma or could be from some type of viral process.  The teeth does look okay but patient does have some areas of pain in that area.  I cannot rule out an early dental space infection.  We will treat the patient with a course of clindamycin.    Assessments & Plan (with Medical Decision Making)  Dental infection, mouth sores     I have reviewed the nursing notes.    I have reviewed the findings, diagnosis, plan and need for follow up with the patient.      New Prescriptions    CLINDAMYCIN (CLEOCIN) 300 MG CAPSULE    Take 1 capsule (300 mg) by mouth 4 times daily for 5 days    HYDROCODONE-ACETAMINOPHEN (NORCO) 5-325 MG TABLET    Take 1 tablet by mouth  every 6 hours as needed for severe pain (7-10)       Final diagnoses:   Dental infection   Mouth sores       11/16/2022   [facility] EMERGENCY DEPT     Taqueria Garcia MD  11/18/22 [id number]

## 2022-11-20 ENCOUNTER — APPOINTMENT (OUTPATIENT)
Dept: ULTRASOUND IMAGING | Facility: CLINIC | Age: 22
End: 2022-11-20
Attending: EMERGENCY MEDICINE
Payer: COMMERCIAL

## 2022-11-20 ENCOUNTER — HOSPITAL ENCOUNTER (EMERGENCY)
Facility: CLINIC | Age: 22
Discharge: HOME | End: 2022-11-20
Attending: EMERGENCY MEDICINE | Admitting: EMERGENCY MEDICINE
Payer: COMMERCIAL

## 2022-11-20 VITALS
SYSTOLIC BLOOD PRESSURE: 114 MMHG | TEMPERATURE: 98 F | DIASTOLIC BLOOD PRESSURE: 74 MMHG | OXYGEN SATURATION: 99 % | BODY MASS INDEX: 19.16 KG/M2 | WEIGHT: 115 LBS | HEART RATE: 91 BPM | HEIGHT: 65 IN | RESPIRATION RATE: 16 BRPM

## 2022-11-20 DIAGNOSIS — K12.30 STOMATITIS AND MUCOSITIS: ICD-10-CM

## 2022-11-20 DIAGNOSIS — K12.1 STOMATITIS AND MUCOSITIS: ICD-10-CM

## 2022-11-20 DIAGNOSIS — K29.00 ACUTE GASTRITIS WITHOUT HEMORRHAGE, UNSPECIFIED GASTRITIS TYPE: ICD-10-CM

## 2022-11-20 LAB
ALBUMIN SERPL BCG-MCNC: 4.5 G/DL (ref 3.5–5.2)
ALP SERPL-CCNC: 123 U/L (ref 40–129)
ALT SERPL W P-5'-P-CCNC: 48 U/L (ref 10–50)
ANION GAP SERPL CALCULATED.3IONS-SCNC: 9 MMOL/L (ref 7–15)
AST SERPL W P-5'-P-CCNC: 38 U/L (ref 10–50)
BASOPHILS # BLD AUTO: 0.1 10E3/UL (ref 0–0.2)
BASOPHILS NFR BLD AUTO: 1 %
BILIRUB SERPL-MCNC: 0.2 MG/DL
BUN SERPL-MCNC: 11.8 MG/DL (ref 6–20)
CALCIUM SERPL-MCNC: 9.7 MG/DL (ref 8.6–10)
CHLORIDE SERPL-SCNC: 97 MMOL/L (ref 98–107)
CREAT SERPL-MCNC: 0.9 MG/DL (ref 0.67–1.17)
DEPRECATED HCO3 PLAS-SCNC: 30 MMOL/L (ref 22–29)
EOSINOPHIL # BLD AUTO: 0 10E3/UL (ref 0–0.7)
EOSINOPHIL NFR BLD AUTO: 0 %
ERYTHROCYTE [DISTWIDTH] IN BLOOD BY AUTOMATED COUNT: 12.6 % (ref 10–15)
FLUAV RNA SPEC QL NAA+PROBE: NEGATIVE
FLUBV RNA RESP QL NAA+PROBE: NEGATIVE
GFR SERPL CREATININE-BSD FRML MDRD: >90 ML/MIN/1.73M2
GLUCOSE SERPL-MCNC: 134 MG/DL (ref 70–99)
HCT VFR BLD AUTO: 43.4 % (ref 40–53)
HGB BLD-MCNC: 14.4 G/DL (ref 13.3–17.7)
IMM GRANULOCYTES # BLD: 0.1 10E3/UL
IMM GRANULOCYTES NFR BLD: 2 %
LIPASE SERPL-CCNC: 18 U/L (ref 13–60)
LYMPHOCYTES # BLD AUTO: 1.2 10E3/UL (ref 0.8–5.3)
LYMPHOCYTES NFR BLD AUTO: 24 %
MCH RBC QN AUTO: 28.4 PG (ref 26.5–33)
MCHC RBC AUTO-ENTMCNC: 33.2 G/DL (ref 31.5–36.5)
MCV RBC AUTO: 86 FL (ref 78–100)
MONOCYTES # BLD AUTO: 0.7 10E3/UL (ref 0–1.3)
MONOCYTES NFR BLD AUTO: 13 %
NEUTROPHILS # BLD AUTO: 3.1 10E3/UL (ref 1.6–8.3)
NEUTROPHILS NFR BLD AUTO: 60 %
NRBC # BLD AUTO: 0 10E3/UL
NRBC BLD AUTO-RTO: 0 /100
PLAT MORPH BLD: ABNORMAL
PLATELET # BLD AUTO: 258 10E3/UL (ref 150–450)
POTASSIUM SERPL-SCNC: 4.3 MMOL/L (ref 3.4–5.3)
PROT SERPL-MCNC: 8 G/DL (ref 6.4–8.3)
RBC # BLD AUTO: 5.07 10E6/UL (ref 4.4–5.9)
RBC MORPH BLD: ABNORMAL
SARS-COV-2 RNA RESP QL NAA+PROBE: NEGATIVE
SODIUM SERPL-SCNC: 136 MMOL/L (ref 136–145)
VARIANT LYMPHS BLD QL SMEAR: PRESENT
WBC # BLD AUTO: 5.1 10E3/UL (ref 4–11)

## 2022-11-20 PROCEDURE — 96374 THER/PROPH/DIAG INJ IV PUSH: CPT | Performed by: EMERGENCY MEDICINE

## 2022-11-20 PROCEDURE — 250N000013 HC RX MED GY IP 250 OP 250 PS 637: Performed by: EMERGENCY MEDICINE

## 2022-11-20 PROCEDURE — 99284 EMERGENCY DEPT VISIT MOD MDM: CPT | Mod: CS | Performed by: EMERGENCY MEDICINE

## 2022-11-20 PROCEDURE — 87636 SARSCOV2 & INF A&B AMP PRB: CPT | Performed by: EMERGENCY MEDICINE

## 2022-11-20 PROCEDURE — C9803 HOPD COVID-19 SPEC COLLECT: HCPCS | Performed by: EMERGENCY MEDICINE

## 2022-11-20 PROCEDURE — 83690 ASSAY OF LIPASE: CPT | Performed by: EMERGENCY MEDICINE

## 2022-11-20 PROCEDURE — 76705 ECHO EXAM OF ABDOMEN: CPT

## 2022-11-20 PROCEDURE — 36415 COLL VENOUS BLD VENIPUNCTURE: CPT | Performed by: EMERGENCY MEDICINE

## 2022-11-20 PROCEDURE — 85014 HEMATOCRIT: CPT | Performed by: EMERGENCY MEDICINE

## 2022-11-20 PROCEDURE — 99285 EMERGENCY DEPT VISIT HI MDM: CPT | Mod: CS,25 | Performed by: EMERGENCY MEDICINE

## 2022-11-20 PROCEDURE — 80053 COMPREHEN METABOLIC PANEL: CPT | Performed by: EMERGENCY MEDICINE

## 2022-11-20 PROCEDURE — 250N000011 HC RX IP 250 OP 636: Performed by: EMERGENCY MEDICINE

## 2022-11-20 PROCEDURE — 250N000009 HC RX 250: Performed by: EMERGENCY MEDICINE

## 2022-11-20 RX ORDER — SUCRALFATE ORAL 1 G/10ML
1 SUSPENSION ORAL 4 TIMES DAILY
Qty: 280 ML | Refills: 0 | Status: SHIPPED | OUTPATIENT
Start: 2022-11-20 | End: 2022-11-27

## 2022-11-20 RX ADMIN — FAMOTIDINE 20 MG: 10 INJECTION INTRAVENOUS at 16:15

## 2022-11-20 RX ADMIN — ALUMINUM HYDROXIDE, MAGNESIUM HYDROXIDE, AND SIMETHICONE 30 ML: 200; 200; 20 SUSPENSION ORAL at 12:21

## 2022-11-20 ASSESSMENT — ACTIVITIES OF DAILY LIVING (ADL)
ADLS_ACUITY_SCORE: 35

## 2022-11-20 NOTE — DISCHARGE INSTRUCTIONS
Use Magic mouthwash as directed prior to eating to help painful sores in her mouth.    Take omeprazole twice daily as directed and Carafate 4 times daily as directed.  These will take several days to take effect.  Continue to take these and follow-up with your primary care provider in 7 to 10 days.    If your symptoms worsen or you develop new or concerning symptoms, please return to the Emergency Department for further evaluation and treatment.

## 2022-11-20 NOTE — ED TRIAGE NOTES
Pt was sent from urgent care for oral ulcers and abd pain. Pt was in ED for dental infection, is on antibiotics currently for this.    Triage Assessment     Row Name 11/20/22 1051       Triage Assessment (Adult)    Airway WDL WDL       Respiratory WDL    Respiratory WDL WDL       Skin Circulation/Temperature WDL    Skin Circulation/Temperature WDL WDL       Cardiac WDL    Cardiac WDL WDL       Peripheral/Neurovascular WDL    Peripheral Neurovascular WDL WDL       Cognitive/Neuro/Behavioral WDL    Cognitive/Neuro/Behavioral WDL WDL

## 2022-11-20 NOTE — ED PROVIDER NOTES
History     Chief Complaint   Patient presents with     Mouth Lesions     Abdominal Pain     HPI  Cathi Gu is a 22 year old male with history of intermitent asthma, Von Willebrand's disease, learning disability adjustment disorder, with complaint of mouth sores and abdominal pain.  Patient had infected teeth removed a little more than a week ago.  Was seen in outside emergency department this past week with ulcers on lips and concerns for dental infection.  Was started empirically on clindamycin.  Since then the sores in his lips and mouth have worsened and he is having difficulty with eating due to mouth pain.  No facial swelling.  No fevers or chills.  No change in voice.  Able to manage his secretions.  No neck pain or stiffness.  Patient also complains of epigastric pain which worsens with eating.  Does not matter what he eats it will exacerbate his pain.  This has been going on for the past few days.  No nausea or vomiting.  No melena or blood in stool.    The patient's PMHx, Surgical Hx, Allergies, and Medications were all reviewed with the patient.    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        HLA-B27 positive 09/02/2016     Priority: Medium     NSAID long-term use 09/02/2016     Priority: Medium     DAVIAN (generalized anxiety disorder) 05/03/2016     Priority: Medium     Adjustment disorder with depressed mood 05/03/2016     Priority: Medium      Intermittent asthma, uncomplicated 2016     Priority: Medium     Delayed puberty 2014     Priority: Medium     Seasonal allergic rhinitis 10/29/2013     Priority: Medium     Learning disability 2011     Priority: Medium        Past Medical History:    Past Medical History:   Diagnosis Date     Asthma, intermittent      Cyclical vomiting age 8y     Learning disability      Von Willebrand disease        Past Surgical History:    Past Surgical History:   Procedure Laterality Date     BIOPSY OF SKIN LESION  2008    mole removal     ESOPHAGOSCOPY, GASTROSCOPY, DUODENOSCOPY (EGD), COMBINED  2014    Procedure: COMBINED ESOPHAGOSCOPY, GASTROSCOPY, DUODENOSCOPY (EGD), BIOPSY SINGLE OR MULTIPLE;  EGD, vomiting;  Surgeon: Leo Chapman MD;  Location: [facility] OR     MYRINGOTOMY, INSERT TUBE BILATERAL, COMBINED Bilateral 2018    Procedure: COMBINED MYRINGOTOMY, INSERT TUBE BILATERAL;  Bilateral myringotomy with tubes;  Surgeon: Rick Watson MD;  Location: [facility] OR     PE tubes in B ears x 2      tubes out     pyloric stenosis         Family History:    Family History   Problem Relation Age of Onset     Other - See Comments Mother         mother 5 ft 1in with menarche at age 15 years;  mother is adopted, her biological parents were related (parent-child)/Concern for genetic syndrome, never worked up fully. Born with 3 toes on each foot.     Other - See Comments Father         unsure height, 5 feet 7 in est     Bipolar Disorder Maternal Grandmother      Bipolar Disorder Maternal Aunt      Bipolar Disorder Other      Schizophrenia Other      Diabetes Other         Maternal great grandfather     Other - See Comments Other         Lupus-- paternal side half brothers     Other - See Comments Other         paternal side half brother,  congenital syndrome at age 1y     Ulcerative Colitis Other         Maternal great grandmother       Social History:  Marital Status:  Single [1]  Social History     Tobacco  "Use     Smoking status: Some Days     Packs/day: 1.00     Types: Cigarettes     Smokeless tobacco: Current     Types: Chew   Vaping Use     Vaping Use: Every day     Substances: Nicotine, Flavoring     Devices: Pre-filled or refillable cartridge, Refillable tank   Substance Use Topics     Alcohol use: Yes     Alcohol/week: 0.0 standard drinks     Comment: occ     Drug use: Yes     Types: Marijuana        Medications:    magic mouthwash suspension (diphenhydrAMINE, lidocaine, aluminum-magnesium & simethicone)  omeprazole (PRILOSEC) 20 MG DR capsule  sucralfate (CARAFATE) 1 GM/10ML suspension  acetaminophen (TYLENOL) 500 MG tablet  albuterol (PROAIR HFA/PROVENTIL HFA/VENTOLIN HFA) 108 (90 Base) MCG/ACT inhaler  clindamycin (CLEOCIN) 300 MG capsule  fluticasone (FLONASE) 50 MCG/ACT nasal spray  HYDROcodone-acetaminophen (NORCO) 5-325 MG tablet          Review of Systems  A complete review of systems performed and is otherwise negative except as noted in the HPI    Physical Exam   BP: 121/82  Pulse: 107  Temp: 98  F (36.7  C)  Resp: 16  Height: 165.1 cm (5' 5\")  Weight: 52.2 kg (115 lb)  SpO2: 99 %    Physical Exam  GEN: Awake, alert, and cooperative.  Appears distressed HENT: Aphthous ulceration lips and mucous membranes of the oral cavity.  No signs of dental abscess.  No fullness or tenderness under tongue.  No edema of posterior oropharynx.  Tenderness of teeth.  EYES: EOM intact. Conjunctiva clear. No discharge.   NECK: Symmetric, freely mobile.  No anterior lymphadenopathy  CV : Regular rate and rhythm.  PULM: Normal effort. No wheezes, rales, or rhonchi bilaterally.  ABD: Soft, epigastric tenderness to palpation, non-distended. No rebound or guarding.  No right upper quadrant tenderness.  No right lower quadrant  NEURO: Normal speech. Following commands. CN II-XII grossly intact. Answering questions and interacting appropriately.   EXT: No gross deformity. Warm and well perfused.  INT: Warm. No diaphoresis. " Normal color.        ED Course        Procedures           Critical Care time:  none               No results found for this or any previous visit (from the past 24 hour(s)).    Medications   lidocaine (viscous) (XYLOCAINE) 2 % 15 mL, alum & mag hydroxide-simethicone (MAALOX) 15 mL GI Cocktail (30 mLs Oral Given 11/20/22 1221)       Assessments & Plan (with Medical Decision Making)   22 year old male with 1 of aphthous ulcers in the mouth and 2 days of epigastric abdominal.  Reassuring vital signs.  CBC without leukocytosis or anemia.  CMP grossly normal, bicarbonate slightly elevated to 30.  Given decreased oral intake this could be a contraction alkalosis.  No elevation of anion gap.  Lipase within normal limits.  SARS-CoV-2 and influenza testing negative.  Patient was given GI cocktail which temporarily improved both his oral and epigastric symptoms.  This improvement lasted approximately 30 minutes then discomfort returned.  Mom is very concerned about possible gallbladder issues as that she had same.  Right upper quadrant ultrasound with no sonographic evidence of cholecystitis, choledocholithiasis.  20 mg IV Pepcid given with slight improvement of symptoms but not resolution.    Patient is concerned that the ulcerations in his mouth are as a result of the dental infection and work that he had done.  I do not see any signs of cellulitis or abscess or complication from such.  At this ulcers unlikely related however given this is his reasoning.  He was prescribed clindamycin recently for possible dental infection.  I do not think additional antibiotic indicated at this time.  Prescription for Magic mouthwash sent.  Regarding his epigastric pain, I think this is most likely related to gastritis/peptic ulcer disease no anemia.  No melena or blood in stool.  Low suspicion for active upper GI hemorrhage.  Also no nausea or vomiting given that his gallbladder feels normal, normal LFTs and total bilirubin I have very  low suspicion for acute biliary pathology.  Lipase is within normal limits and no history of pancreatitis, very low suspicion for acute pancreatitis.  My plan is for omeprazole, Carafate and clinic follow-up in 1 to 2 weeks.  This will give time to see if these medications have helped.  Patient instructed to return to emergency department for new or concerning symptoms.    I have reviewed the nursing notes.         Discharge Medication List as of 11/20/2022  4:51 PM      START taking these medications    Details   magic mouthwash suspension (diphenhydrAMINE, lidocaine, aluminum-magnesium & simethicone) Swish and swallow 10 mLs in mouth every 6 hours as needed for mouth sores, Disp-120 mL, R-0, E-Prescribe      omeprazole (PRILOSEC) 20 MG DR capsule Take 1 capsule (20 mg) by mouth 2 times daily, Disp-14 capsule, R-0, E-Prescribe      sucralfate (CARAFATE) 1 GM/10ML suspension Take 10 mLs (1 g) by mouth 4 times daily for 7 days, Disp-280 mL, R-0, E-Prescribe             Final diagnoses:   Stomatitis and mucositis   Acute gastritis without hemorrhage, unspecified gastritis type     German Loera MD    11/20/2022   [facility] EMERGENCY DEPT    Disclaimer: This note consists of words and symbols derived from keyboarding and dictation using voice recognition software.  As a result, there may be errors that have gone undetected.  Please consider this when interpreting information found in this note.             German Loera MD  11/21/22 [id number]

## 2022-11-22 ENCOUNTER — NURSE TRIAGE (OUTPATIENT)
Dept: NURSING | Facility: CLINIC | Age: 22
End: 2022-11-22

## 2022-11-22 ENCOUNTER — HOSPITAL ENCOUNTER (EMERGENCY)
Facility: CLINIC | Age: 22
Discharge: HOME | End: 2022-11-22
Attending: EMERGENCY MEDICINE | Admitting: EMERGENCY MEDICINE
Payer: COMMERCIAL

## 2022-11-22 ENCOUNTER — APPOINTMENT (OUTPATIENT)
Dept: CT IMAGING | Facility: CLINIC | Age: 22
End: 2022-11-22
Attending: EMERGENCY MEDICINE
Payer: COMMERCIAL

## 2022-11-22 VITALS
HEART RATE: 78 BPM | BODY MASS INDEX: 17.77 KG/M2 | RESPIRATION RATE: 16 BRPM | WEIGHT: 106.8 LBS | OXYGEN SATURATION: 99 % | SYSTOLIC BLOOD PRESSURE: 110 MMHG | TEMPERATURE: 98.3 F | DIASTOLIC BLOOD PRESSURE: 68 MMHG

## 2022-11-22 DIAGNOSIS — R10.9 ABDOMINAL PAIN, UNSPECIFIED ABDOMINAL LOCATION: ICD-10-CM

## 2022-11-22 LAB
ALBUMIN SERPL-MCNC: 4.2 G/DL (ref 3.4–5)
ALP SERPL-CCNC: 118 U/L (ref 40–150)
ALT SERPL W P-5'-P-CCNC: 44 U/L (ref 0–70)
ANION GAP SERPL CALCULATED.3IONS-SCNC: 7 MMOL/L (ref 3–14)
AST SERPL W P-5'-P-CCNC: 19 U/L (ref 0–45)
BASOPHILS # BLD AUTO: 0.1 10E3/UL (ref 0–0.2)
BASOPHILS NFR BLD AUTO: 1 %
BILIRUB SERPL-MCNC: 0.3 MG/DL (ref 0.2–1.3)
BUN SERPL-MCNC: 16 MG/DL (ref 7–30)
CALCIUM SERPL-MCNC: 9.9 MG/DL (ref 8.5–10.1)
CHLORIDE BLD-SCNC: 100 MMOL/L (ref 94–109)
CO2 SERPL-SCNC: 29 MMOL/L (ref 20–32)
CREAT SERPL-MCNC: 0.77 MG/DL (ref 0.66–1.25)
EOSINOPHIL # BLD AUTO: 0 10E3/UL (ref 0–0.7)
EOSINOPHIL NFR BLD AUTO: 0 %
ERYTHROCYTE [DISTWIDTH] IN BLOOD BY AUTOMATED COUNT: 12.5 % (ref 10–15)
GFR SERPL CREATININE-BSD FRML MDRD: >90 ML/MIN/1.73M2
GLUCOSE BLD-MCNC: 90 MG/DL (ref 70–99)
HCT VFR BLD AUTO: 48 % (ref 40–53)
HGB BLD-MCNC: 15.8 G/DL (ref 13.3–17.7)
IMM GRANULOCYTES # BLD: 0.1 10E3/UL
IMM GRANULOCYTES NFR BLD: 1 %
LIPASE SERPL-CCNC: 230 U/L (ref 73–393)
LYMPHOCYTES # BLD AUTO: 2.2 10E3/UL (ref 0.8–5.3)
LYMPHOCYTES NFR BLD AUTO: 34 %
MCH RBC QN AUTO: 29.2 PG (ref 26.5–33)
MCHC RBC AUTO-ENTMCNC: 32.9 G/DL (ref 31.5–36.5)
MCV RBC AUTO: 89 FL (ref 78–100)
MONOCYTES # BLD AUTO: 0.7 10E3/UL (ref 0–1.3)
MONOCYTES NFR BLD AUTO: 10 %
NEUTROPHILS # BLD AUTO: 3.4 10E3/UL (ref 1.6–8.3)
NEUTROPHILS NFR BLD AUTO: 54 %
NRBC # BLD AUTO: 0 10E3/UL
NRBC BLD AUTO-RTO: 0 /100
PLAT MORPH BLD: NORMAL
PLATELET # BLD AUTO: 332 10E3/UL (ref 150–450)
POTASSIUM BLD-SCNC: 3.9 MMOL/L (ref 3.4–5.3)
PROT SERPL-MCNC: 8.8 G/DL (ref 6.8–8.8)
RBC # BLD AUTO: 5.42 10E6/UL (ref 4.4–5.9)
RBC MORPH BLD: NORMAL
SODIUM SERPL-SCNC: 136 MMOL/L (ref 133–144)
WBC # BLD AUTO: 6.3 10E3/UL (ref 4–11)

## 2022-11-22 PROCEDURE — 99284 EMERGENCY DEPT VISIT MOD MDM: CPT | Performed by: EMERGENCY MEDICINE

## 2022-11-22 PROCEDURE — 250N000013 HC RX MED GY IP 250 OP 250 PS 637: Performed by: EMERGENCY MEDICINE

## 2022-11-22 PROCEDURE — 36415 COLL VENOUS BLD VENIPUNCTURE: CPT | Performed by: EMERGENCY MEDICINE

## 2022-11-22 PROCEDURE — 74177 CT ABD & PELVIS W/CONTRAST: CPT

## 2022-11-22 PROCEDURE — 85025 COMPLETE CBC W/AUTO DIFF WBC: CPT | Performed by: EMERGENCY MEDICINE

## 2022-11-22 PROCEDURE — 99285 EMERGENCY DEPT VISIT HI MDM: CPT | Mod: 25 | Performed by: EMERGENCY MEDICINE

## 2022-11-22 PROCEDURE — 80053 COMPREHEN METABOLIC PANEL: CPT | Performed by: EMERGENCY MEDICINE

## 2022-11-22 PROCEDURE — 258N000003 HC RX IP 258 OP 636: Performed by: EMERGENCY MEDICINE

## 2022-11-22 PROCEDURE — 82040 ASSAY OF SERUM ALBUMIN: CPT | Performed by: EMERGENCY MEDICINE

## 2022-11-22 PROCEDURE — 83690 ASSAY OF LIPASE: CPT | Performed by: EMERGENCY MEDICINE

## 2022-11-22 PROCEDURE — 96360 HYDRATION IV INFUSION INIT: CPT | Mod: 59 | Performed by: EMERGENCY MEDICINE

## 2022-11-22 PROCEDURE — 250N000009 HC RX 250: Performed by: EMERGENCY MEDICINE

## 2022-11-22 PROCEDURE — 250N000011 HC RX IP 250 OP 636: Performed by: EMERGENCY MEDICINE

## 2022-11-22 RX ORDER — SUCRALFATE 1 G/1
1 TABLET ORAL 4 TIMES DAILY
COMMUNITY
Start: 2022-11-20 | End: 2022-12-20

## 2022-11-22 RX ORDER — LIDOCAINE HYDROCHLORIDE 20 MG/ML
SOLUTION OROPHARYNGEAL
COMMUNITY
Start: 2022-11-20 | End: 2022-11-22

## 2022-11-22 RX ORDER — IOPAMIDOL 755 MG/ML
500 INJECTION, SOLUTION INTRAVASCULAR ONCE
Status: COMPLETED | OUTPATIENT
Start: 2022-11-22 | End: 2022-11-22

## 2022-11-22 RX ADMIN — SODIUM CHLORIDE 61 ML: 9 INJECTION, SOLUTION INTRAVENOUS at 20:26

## 2022-11-22 RX ADMIN — SODIUM CHLORIDE 1000 ML: 9 INJECTION, SOLUTION INTRAVENOUS at 20:18

## 2022-11-22 RX ADMIN — ALUMINUM HYDROXIDE, MAGNESIUM HYDROXIDE, AND SIMETHICONE 30 ML: 200; 200; 20 SUSPENSION ORAL at 20:21

## 2022-11-22 RX ADMIN — IOPAMIDOL 52 ML: 755 INJECTION, SOLUTION INTRAVENOUS at 20:27

## 2022-11-22 ASSESSMENT — ACTIVITIES OF DAILY LIVING (ADL): ADLS_ACUITY_SCORE: 35

## 2022-11-22 NOTE — TELEPHONE ENCOUNTER
Triage Call:    Caller: Patient     Amanda was seen in the ER on 11/20  And he is still having a lot of swelliing in his mouth and is wanting to know how to get it to go down.  Patient reports it has not gotten worse.       Per visit note, magic mouthwash was sent in and was already on antibiotic from previous ED visit for possibly infection.    Patient reports that the magic mouthwash isn't working anymore.   Rating pain 9/10.  Patient reports that he hasn't been able to eat, is able to get in some fluids.    Last urine output was 1 hour before call.   He also reports that he abdominal pain also has gotten worse.          Protocol Recommended Disposition: ED    Caller verbalized understanding of instructions and questions answered.      Airam Mcmanus RN on 11/22/2022 at 5:02 PM        Reason for Disposition    [1] Drooling or spitting out saliva (because can't swallow) AND [2] new-onset    Additional Information    Negative: SEVERE difficulty breathing (e.g., struggling for each breath, speaks in single words, stridor)    Negative: Sounds like a life-threatening emergency to the triager    Negative: Followed a tooth (or teeth) injury    Negative: Followed a mouth injury    Negative: Throat is painful    Negative: Canker sore suspected (i.e., aphthous ulcer) in the mouth    Negative: Cold sore suspected (i.e., fever blister sore) on the outer lip    Negative: Tooth is painful or swelling around a tooth    Protocols used: MOUTH PAIN-A-AH

## 2022-11-23 NOTE — ED PROVIDER NOTES
History     Chief Complaint   Patient presents with     Abdominal Pain     Mouth Lesions     HPI  Cathi Gu is a 22 year old male who presents with right-sided abdominal pain that is been present over the last 3 to 4 weeks.  Pain has been intermittent and sharp.  No trauma.  No measured fever.  He has been not holding much down.  No vomiting but some gagging he reports.  No diarrhea.  Only surgery was for pyloric stenosis when he was an infant.  He was seen yesterday in the emergency department at [facility] in [place].  At that visit he had a normal right upper quadrant ultrasound and it was felt this pain may be represented peptic ulcer disease.  A PPI and Carafate recommended.  He states he did not start any of these and he does not quite remember the visit as he was quite out of it.    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        HLA-B27 positive 09/02/2016     Priority: Medium     NSAID long-term use 09/02/2016     Priority: Medium     DAVIAN (generalized anxiety disorder) 05/03/2016     Priority: Medium     Adjustment disorder with depressed mood 05/03/2016     Priority: Medium     Intermittent asthma, uncomplicated 03/11/2016     Priority: Medium     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     Priority: Medium     Seasonal allergic rhinitis 10/29/2013     Priority: Medium     Learning disability 09/28/2011     Priority: Medium        Past Medical History:    Past  Medical History:   Diagnosis Date     Asthma, intermittent      Cyclical vomiting age 8y     Learning disability      Von Willebrand disease        Past Surgical History:    Past Surgical History:   Procedure Laterality Date     BIOPSY OF SKIN LESION  2008    mole removal     ESOPHAGOSCOPY, GASTROSCOPY, DUODENOSCOPY (EGD), COMBINED  2014    Procedure: COMBINED ESOPHAGOSCOPY, GASTROSCOPY, DUODENOSCOPY (EGD), BIOPSY SINGLE OR MULTIPLE;  EGD, vomiting;  Surgeon: Leo Chapman MD;  Location: [facility] OR     MYRINGOTOMY, INSERT TUBE BILATERAL, COMBINED Bilateral 2018    Procedure: COMBINED MYRINGOTOMY, INSERT TUBE BILATERAL;  Bilateral myringotomy with tubes;  Surgeon: Rick Watson MD;  Location: [facility] OR     PE tubes in B ears x 2      tubes out     pyloric stenosis         Family History:    Family History   Problem Relation Age of Onset     Other - See Comments Mother         mother 5 ft 1in with menarche at age 15 years;  mother is adopted, her biological parents were related (parent-child)/Concern for genetic syndrome, never worked up fully. Born with 3 toes on each foot.     Other - See Comments Father         unsure height, 5 feet 7 in est     Bipolar Disorder Maternal Grandmother      Bipolar Disorder Maternal Aunt      Bipolar Disorder Other      Schizophrenia Other      Diabetes Other         Maternal great grandfather     Other - See Comments Other         Lupus-- paternal side half brothers     Other - See Comments Other         paternal side half brother,  congenital syndrome at age 1y     Ulcerative Colitis Other         Maternal great grandmother       Social History:  Marital Status:  Single [1]  Social History     Tobacco Use     Smoking status: Some Days     Packs/day: 0.25     Types: Cigarettes     Smokeless tobacco: Current     Types: Chew   Vaping Use     Vaping Use: Every day     Substances: Nicotine, Flavoring     Devices: Pre-filled or refillable cartridge, Refillable tank   Substance  Use Topics     Alcohol use: Yes     Alcohol/week: 0.0 standard drinks     Comment: occ     Drug use: Yes     Types: Marijuana        Medications:    acetaminophen (TYLENOL) 500 MG tablet  albuterol (PROAIR HFA/PROVENTIL HFA/VENTOLIN HFA) 108 (90 Base) MCG/ACT inhaler  fluticasone (FLONASE) 50 MCG/ACT nasal spray  HYDROcodone-acetaminophen (NORCO) 5-325 MG tablet  magic mouthwash suspension (diphenhydrAMINE, lidocaine, aluminum-magnesium & simethicone)  omeprazole (PRILOSEC) 20 MG DR capsule  sucralfate (CARAFATE) 1 GM tablet  sucralfate (CARAFATE) 1 GM/10ML suspension          Review of Systems  All other systems are reviewed and are negative    Physical Exam   BP: 108/79  Pulse: 88  Temp: 98.3  F (36.8  C)  Resp: 18  Weight: 48.4 kg (106 lb 12.8 oz)  SpO2: 99 %      Physical Exam  Vitals and nursing note reviewed.   Constitutional:       General: He is not in acute distress.     Appearance: He is well-developed and well-nourished. He is not diaphoretic.   HENT:      Head: Normocephalic and atraumatic.      Mouth/Throat:      Mouth: Oropharynx is clear and moist.   Eyes:      General: No scleral icterus.        Right eye: No discharge.         Left eye: No discharge.      Conjunctiva/sclera: Conjunctivae normal.   Cardiovascular:      Rate and Rhythm: Normal rate and regular rhythm.      Heart sounds: Normal heart sounds. No murmur heard.  Pulmonary:      Effort: Pulmonary effort is normal. No respiratory distress.      Breath sounds: Normal breath sounds. No stridor.   Abdominal:      Palpations: Abdomen is soft.      Tenderness: There is abdominal tenderness (mild in right mid abdomen). There is no guarding or rebound.   Musculoskeletal:         General: Normal range of motion.      Cervical back: Normal range of motion and neck supple.   Skin:     General: Skin is warm and dry.      Coloration: Skin is not pale.      Findings: No erythema or rash.   Neurological:      Mental Status: He is alert.      Cranial  Nerves: No cranial nerve deficit.      Motor: No abnormal muscle tone.   Psychiatric:         Mood and Affect: Mood and affect normal.         ED Course                 Procedures              Critical Care time:  none               Results for orders placed or performed during the hospital encounter of 11/22/22 (from the past 24 hour(s))   CBC with platelets differential    Narrative    The following orders were created for panel order CBC with platelets differential.  Procedure                               Abnormality         Status                     ---------                               -----------         ------                     CBC with platelets and d...[[id number]]                      Final result               RBC and Platelet Morphology[[id number]]                      Final result                 Please view results for these tests on the individual orders.   Comprehensive metabolic panel   Result Value Ref Range    Sodium 136 133 - 144 mmol/L    Potassium 3.9 3.4 - 5.3 mmol/L    Chloride 100 94 - 109 mmol/L    Carbon Dioxide (CO2) 29 20 - 32 mmol/L    Anion Gap 7 3 - 14 mmol/L    Urea Nitrogen 16 7 - 30 mg/dL    Creatinine 0.77 0.66 - 1.25 mg/dL    Calcium 9.9 8.5 - 10.1 mg/dL    Glucose 90 70 - 99 mg/dL    Alkaline Phosphatase 118 40 - 150 U/L    AST 19 0 - 45 U/L    ALT 44 0 - 70 U/L    Protein Total 8.8 6.8 - 8.8 g/dL    Albumin 4.2 3.4 - 5.0 g/dL    Bilirubin Total 0.3 0.2 - 1.3 mg/dL    GFR Estimate >90 >60 mL/min/1.73m2   Lipase   Result Value Ref Range    Lipase 230 73 - 393 U/L   CBC with platelets and differential   Result Value Ref Range    WBC Count 6.3 4.0 - 11.0 10e3/uL    RBC Count 5.42 4.40 - 5.90 10e6/uL    Hemoglobin 15.8 13.3 - 17.7 g/dL    Hematocrit 48.0 40.0 - 53.0 %    MCV 89 78 - 100 fL    MCH 29.2 26.5 - 33.0 pg    MCHC 32.9 31.5 - 36.5 g/dL    RDW 12.5 10.0 - 15.0 %    Platelet Count 332 150 - 450 10e3/uL    % Neutrophils 54 %    % Lymphocytes 34 %    % Monocytes 10 %    %  Eosinophils 0 %    % Basophils 1 %    % Immature Granulocytes 1 %    NRBCs per 100 WBC 0 <1 /100    Absolute Neutrophils 3.4 1.6 - 8.3 10e3/uL    Absolute Lymphocytes 2.2 0.8 - 5.3 10e3/uL    Absolute Monocytes 0.7 0.0 - 1.3 10e3/uL    Absolute Eosinophils 0.0 0.0 - 0.7 10e3/uL    Absolute Basophils 0.1 0.0 - 0.2 10e3/uL    Absolute Immature Granulocytes 0.1 <=0.4 10e3/uL    Absolute NRBCs 0.0 10e3/uL   RBC and Platelet Morphology   Result Value Ref Range    Platelet Assessment  Automated Count Confirmed. Platelet morphology is normal.     Automated Count Confirmed. Platelet morphology is normal.    RBC Morphology Confirmed RBC Indices    CT Abdomen Pelvis w Contrast    Narrative    EXAM: CT ABDOMEN PELVIS W CONTRAST  LOCATION: [facility]  DATE/TIME: 11/22/2022 8:35 PM    INDICATION: right sided upper abd pain  COMPARISON: Ultrasound 11/20/2022.  TECHNIQUE: CT scan of the abdomen and pelvis was performed following injection of IV contrast. Multiplanar reformats were obtained. Dose reduction techniques were used.  CONTRAST: ISOVUE 370, 52 mL    FINDINGS:   LOWER CHEST: Normal.    HEPATOBILIARY: Normal.    PANCREAS: Normal.    SPLEEN: Normal.    ADRENAL GLANDS: Normal.    KIDNEYS/BLADDER: Normal.    BOWEL: Normal bowel loops. Nothing for acute inflammatory change. Although I don't definitively identify the appendix nothing concerning for acute appendicitis.    LYMPH NODES: Normal.    VASCULATURE: Unremarkable.    PELVIC ORGANS: Normal.    MUSCULOSKELETAL: Normal.      Impression    IMPRESSION:   1.  Negative CT abdomen and pelvis.       Medications   0.9% sodium chloride BOLUS (1,000 mLs Intravenous New Bag 11/22/22 2018)   lidocaine (viscous) (XYLOCAINE) 2 % 15 mL, alum & mag hydroxide-simethicone (MAALOX) 15 mL GI Cocktail (30 mLs Oral Given 11/22/22 2021)   iopamidol (ISOVUE-370) solution 500 mL (52 mLs Intravenous Given 11/22/22 2027)   sodium chloride 0.9 % bag 100mL for CT scan  flush use (61 mLs Intravenous Given 11/22/22 2026)       Assessments & Plan (with Medical Decision Making)  22-year-old male with some ongoing right-sided abdominal discomfort.  Was seen in the emergency department yesterday at [facility].  Work-up including ultrasound negative.  Today exam is relatively benign.  CT and lab work negative.  As yesterday, suspect peptic ulcer disease.  I recommended taking the omeprazole twice a day as recommended.  May use antacids in between for discomfort     I have reviewed the nursing notes.    I have reviewed the findings, diagnosis, plan and need for follow up with the patient.       New Prescriptions    No medications on file       Final diagnoses:   Abdominal pain, unspecified abdominal location       11/22/2022   [facility] EMERGENCY DEPT     Júnior Gilbert MD  11/22/22 2149

## 2022-11-23 NOTE — ED TRIAGE NOTES
Pt reports continued abdominal pain and is still having swelling and sores in the mouth, reports he had an US of his gallbladder yesterday at [place]

## 2022-11-23 NOTE — DISCHARGE INSTRUCTIONS
Take the omeprazole twice a day as instructed.  Also recommend using the Carafate as instructed or Maalox or Tums for discomfort

## 2022-12-20 ENCOUNTER — APPOINTMENT (OUTPATIENT)
Dept: CT IMAGING | Facility: CLINIC | Age: 22
End: 2022-12-20
Attending: EMERGENCY MEDICINE
Payer: COMMERCIAL

## 2022-12-20 ENCOUNTER — HOSPITAL ENCOUNTER (EMERGENCY)
Facility: CLINIC | Age: 22
Discharge: HOME | End: 2022-12-20
Attending: EMERGENCY MEDICINE | Admitting: EMERGENCY MEDICINE
Payer: COMMERCIAL

## 2022-12-20 VITALS
OXYGEN SATURATION: 100 % | DIASTOLIC BLOOD PRESSURE: 83 MMHG | WEIGHT: 115 LBS | BODY MASS INDEX: 19.14 KG/M2 | RESPIRATION RATE: 19 BRPM | HEART RATE: 71 BPM | SYSTOLIC BLOOD PRESSURE: 124 MMHG

## 2022-12-20 DIAGNOSIS — S06.0X9A CONCUSSION WITH LOSS OF CONSCIOUSNESS, INITIAL ENCOUNTER: ICD-10-CM

## 2022-12-20 DIAGNOSIS — R55 SYNCOPE, UNSPECIFIED SYNCOPE TYPE: ICD-10-CM

## 2022-12-20 LAB
ALBUMIN SERPL-MCNC: 4 G/DL (ref 3.4–5)
ALP SERPL-CCNC: 85 U/L (ref 40–150)
ALT SERPL W P-5'-P-CCNC: 25 U/L (ref 0–70)
ANION GAP SERPL CALCULATED.3IONS-SCNC: 6 MMOL/L (ref 3–14)
AST SERPL W P-5'-P-CCNC: 15 U/L (ref 0–45)
BASOPHILS # BLD AUTO: 0.1 10E3/UL (ref 0–0.2)
BASOPHILS NFR BLD AUTO: 2 %
BILIRUB SERPL-MCNC: 0.3 MG/DL (ref 0.2–1.3)
BUN SERPL-MCNC: 21 MG/DL (ref 7–30)
CALCIUM SERPL-MCNC: 9.3 MG/DL (ref 8.5–10.1)
CHLORIDE BLD-SCNC: 105 MMOL/L (ref 94–109)
CO2 SERPL-SCNC: 30 MMOL/L (ref 20–32)
CREAT SERPL-MCNC: 0.86 MG/DL (ref 0.66–1.25)
D DIMER PPP FEU-MCNC: 0.3 UG/ML FEU (ref 0–0.5)
EOSINOPHIL # BLD AUTO: 0.1 10E3/UL (ref 0–0.7)
EOSINOPHIL NFR BLD AUTO: 1 %
ERYTHROCYTE [DISTWIDTH] IN BLOOD BY AUTOMATED COUNT: 14.3 % (ref 10–15)
GFR SERPL CREATININE-BSD FRML MDRD: >90 ML/MIN/1.73M2
GLUCOSE BLD-MCNC: 83 MG/DL (ref 70–99)
HCT VFR BLD AUTO: 43.9 % (ref 40–53)
HGB BLD-MCNC: 14.2 G/DL (ref 13.3–17.7)
IMM GRANULOCYTES # BLD: 0.2 10E3/UL
IMM GRANULOCYTES NFR BLD: 2 %
LYMPHOCYTES # BLD AUTO: 1.8 10E3/UL (ref 0.8–5.3)
LYMPHOCYTES NFR BLD AUTO: 22 %
MCH RBC QN AUTO: 29 PG (ref 26.5–33)
MCHC RBC AUTO-ENTMCNC: 32.3 G/DL (ref 31.5–36.5)
MCV RBC AUTO: 90 FL (ref 78–100)
MONOCYTES # BLD AUTO: 0.6 10E3/UL (ref 0–1.3)
MONOCYTES NFR BLD AUTO: 7 %
NEUTROPHILS # BLD AUTO: 5.4 10E3/UL (ref 1.6–8.3)
NEUTROPHILS NFR BLD AUTO: 66 %
NRBC # BLD AUTO: 0 10E3/UL
NRBC BLD AUTO-RTO: 0 /100
PLATELET # BLD AUTO: 275 10E3/UL (ref 150–450)
POTASSIUM BLD-SCNC: 3.8 MMOL/L (ref 3.4–5.3)
PROT SERPL-MCNC: 7.8 G/DL (ref 6.8–8.8)
RBC # BLD AUTO: 4.9 10E6/UL (ref 4.4–5.9)
SODIUM SERPL-SCNC: 141 MMOL/L (ref 133–144)
TROPONIN I SERPL HS-MCNC: <3 NG/L
WBC # BLD AUTO: 8.1 10E3/UL (ref 4–11)

## 2022-12-20 PROCEDURE — 99285 EMERGENCY DEPT VISIT HI MDM: CPT | Mod: 25 | Performed by: EMERGENCY MEDICINE

## 2022-12-20 PROCEDURE — 93010 ELECTROCARDIOGRAM REPORT: CPT | Performed by: EMERGENCY MEDICINE

## 2022-12-20 PROCEDURE — 72125 CT NECK SPINE W/O DYE: CPT

## 2022-12-20 PROCEDURE — 258N000003 HC RX IP 258 OP 636: Performed by: EMERGENCY MEDICINE

## 2022-12-20 PROCEDURE — 85049 AUTOMATED PLATELET COUNT: CPT | Performed by: EMERGENCY MEDICINE

## 2022-12-20 PROCEDURE — 93005 ELECTROCARDIOGRAM TRACING: CPT | Performed by: EMERGENCY MEDICINE

## 2022-12-20 PROCEDURE — 96360 HYDRATION IV INFUSION INIT: CPT | Performed by: EMERGENCY MEDICINE

## 2022-12-20 PROCEDURE — 70450 CT HEAD/BRAIN W/O DYE: CPT

## 2022-12-20 PROCEDURE — 85379 FIBRIN DEGRADATION QUANT: CPT | Performed by: EMERGENCY MEDICINE

## 2022-12-20 PROCEDURE — 36415 COLL VENOUS BLD VENIPUNCTURE: CPT | Performed by: EMERGENCY MEDICINE

## 2022-12-20 PROCEDURE — 80053 COMPREHEN METABOLIC PANEL: CPT | Performed by: EMERGENCY MEDICINE

## 2022-12-20 PROCEDURE — 84484 ASSAY OF TROPONIN QUANT: CPT | Performed by: EMERGENCY MEDICINE

## 2022-12-20 RX ORDER — SODIUM CHLORIDE 9 MG/ML
INJECTION, SOLUTION INTRAVENOUS CONTINUOUS
Status: DISCONTINUED | OUTPATIENT
Start: 2022-12-20 | End: 2022-12-20 | Stop reason: HOSPADM

## 2022-12-20 RX ADMIN — SODIUM CHLORIDE 1000 ML: 9 INJECTION, SOLUTION INTRAVENOUS at 14:51

## 2022-12-20 ASSESSMENT — ACTIVITIES OF DAILY LIVING (ADL): ADLS_ACUITY_SCORE: 35

## 2022-12-20 NOTE — ED TRIAGE NOTES
"Patient had a syncopal episode last evening around 2130, was standing by the counter getting a drink of water and then \"woke up on the floor\". Chipped front tooth. C/O dizziness and headache today.     Triage Assessment     Row Name 12/20/22 0040       Triage Assessment (Adult)    Airway WDL WDL       Respiratory WDL    Respiratory WDL WDL       Skin Circulation/Temperature WDL    Skin Circulation/Temperature WDL WDL       Cardiac WDL    Cardiac WDL WDL       Peripheral/Neurovascular WDL    Peripheral Neurovascular WDL WDL              "

## 2022-12-20 NOTE — DISCHARGE INSTRUCTIONS
Your CAT scan and blood work looked good today.  I am not sure what the cause of your passing out was but it may be related to dehydration and orthostatic hypotension.  Use caution when getting up and if you feel dizzy sit back down again.  Follow-up with a dentist to repair your tooth.  If symptoms persist you may need further work-up such as a Holter monitor, echocardiogram.

## 2022-12-20 NOTE — ED PROVIDER NOTES
"  History     Chief Complaint   Patient presents with     Syncope     HPI  Cathi Gu is a 22 year old male who has a history of von Willebrand's disease, suicidal ideation, depression, asthma amongst other medical diagnoses secondary to a syncopal episode that occurred last evening around 2130 at a \"[employer]\".  He felt dizzy so he was going to the kitchen to get a drink of water when he felt more dizzy and suddenly passed out striking his mouth on the granite countertop and fell onto the ground hitting his head.  He had full loss of consciousness.  It took him a little while to recover in order to get up and go to the couch and rest for a bit before going home. There is was no vertigo or room spinning dizziness.  No ear symptoms or ear congestion.  She was standing by the counter getting a drink of water and then woke up on the floor.  At this time she has dizziness and headache.    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        HLA-B27 positive 09/02/2016     Priority: Medium     NSAID long-term use 09/02/2016     Priority: Medium     DAVIAN (generalized anxiety disorder) 05/03/2016     Priority: Medium     Adjustment disorder with depressed mood 05/03/2016     Priority: Medium     Intermittent asthma, uncomplicated 03/11/2016     Priority: Medium     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     Priority: Medium     Seasonal allergic rhinitis 10/29/2013     Priority: Medium "     Learning disability 2011     Priority: Medium        Past Medical History:    Past Medical History:   Diagnosis Date     Asthma, intermittent      Cyclical vomiting age 8y     Learning disability      Von Willebrand disease        Past Surgical History:    Past Surgical History:   Procedure Laterality Date     BIOPSY OF SKIN LESION  2008    mole removal     ESOPHAGOSCOPY, GASTROSCOPY, DUODENOSCOPY (EGD), COMBINED  2014    Procedure: COMBINED ESOPHAGOSCOPY, GASTROSCOPY, DUODENOSCOPY (EGD), BIOPSY SINGLE OR MULTIPLE;  EGD, vomiting;  Surgeon: Leo Chapman MD;  Location: [facility]     MYRINGOTOMY, INSERT TUBE BILATERAL, COMBINED Bilateral 2018    Procedure: COMBINED MYRINGOTOMY, INSERT TUBE BILATERAL;  Bilateral myringotomy with tubes;  Surgeon: Rick Watson MD;  Location: [facility] OR     PE tubes in B ears x 2      tubes out     pyloric stenosis         Family History:    Family History   Problem Relation Age of Onset     Other - See Comments Mother         mother 5 ft 1in with menarche at age 15 years;  mother is adopted, her biological parents were related (parent-child)/Concern for genetic syndrome, never worked up fully. Born with 3 toes on each foot.     Other - See Comments Father         unsure height, 5 feet 7 in est     Bipolar Disorder Maternal Grandmother      Bipolar Disorder Maternal Aunt      Bipolar Disorder Other      Schizophrenia Other      Diabetes Other         Maternal great grandfather     Other - See Comments Other         Lupus-- paternal side half brothers     Other - See Comments Other         paternal side half brother,  congenital syndrome at age 1y     Ulcerative Colitis Other         Maternal great grandmother       Social History:  Marital Status:  Single [1]  Social History     Tobacco Use     Smoking status: Some Days     Packs/day: 0.25     Types: Cigarettes     Smokeless tobacco: Current     Types: Chew   Vaping Use     Vaping Use: Every day     Substances:  Nicotine, Flavoring     Devices: Pre-filled or refillable cartridge, Refillable tank   Substance Use Topics     Alcohol use: Yes     Alcohol/week: 0.0 standard drinks     Comment: occ     Drug use: Yes     Types: Marijuana        Medications:    acetaminophen (TYLENOL) 500 MG tablet  albuterol (PROAIR HFA/PROVENTIL HFA/VENTOLIN HFA) 108 (90 Base) MCG/ACT inhaler  omeprazole (PRILOSEC) 20 MG DR capsule          Review of Systems   All other systems reviewed and are negative.      Physical Exam   BP: 124/83  Pulse: 71  Resp: 18  Weight: 52.2 kg (115 lb)  SpO2: 100 %  Lying Orthostatic BP: 118/67  Lying Orthostatic Pulse: 79 bpm  Sitting Orthostatic BP: 120/71  Sitting Orthostatic Pulse: 90 bpm  Standing Orthostatic BP: 124/70  Standing Orthostatic Pulse: 101 bpm      Physical Exam  Vitals and nursing note reviewed.   Constitutional:       General: He is not in acute distress.     Appearance: He is well-developed. He is not diaphoretic.   HENT:      Head: Normocephalic and atraumatic.      Right Ear: External ear normal.      Left Ear: External ear normal.      Nose: Nose normal.      Mouth/Throat:      Mouth: Mucous membranes are moist.      Pharynx: Oropharynx is clear.      Comments: Fractured left incisor.  Eyes:      General: No scleral icterus.        Right eye: No discharge.         Left eye: No discharge.      Extraocular Movements: Extraocular movements intact.      Conjunctiva/sclera: Conjunctivae normal.      Pupils: Pupils are equal, round, and reactive to light.   Neck:      Comments: Mild tenderness to palpation over the right paraspinal cervical musculature.  Cardiovascular:      Rate and Rhythm: Normal rate and regular rhythm.   Pulmonary:      Effort: Pulmonary effort is normal.   Musculoskeletal:         General: Normal range of motion.      Cervical back: Normal range of motion and neck supple. Tenderness present.      Right lower leg: No edema.      Left lower leg: No edema.   Skin:     General:  Skin is warm and dry.      Findings: No rash.   Neurological:      General: No focal deficit present.      Mental Status: He is alert and oriented to person, place, and time.   Psychiatric:         Mood and Affect: Mood normal.         Thought Content: Thought content normal.         ED Course                 Procedures              EKG Interpretation:      Interpreted by Maurice Schaeffer MD  Time reviewed: 1428  Symptoms at time of EKG: syncope   Rhythm: normal sinus   Rate: normal  Axis: normal  Ectopy: none  Conduction: short pr   ST Segments/ T Waves: J point elevation   Q Waves: none  Comparison to prior: No old EKG available    Clinical Impression: short pr, j point elevation, nsr                    Results for orders placed or performed during the hospital encounter of 12/20/22 (from the past 24 hour(s))   CBC with platelets differential    Narrative    The following orders were created for panel order CBC with platelets differential.  Procedure                               Abnormality         Status                     ---------                               -----------         ------                     CBC with platelets and d...[[id number]]                      Final result                 Please view results for these tests on the individual orders.   Comprehensive metabolic panel   Result Value Ref Range    Sodium 141 133 - 144 mmol/L    Potassium 3.8 3.4 - 5.3 mmol/L    Chloride 105 94 - 109 mmol/L    Carbon Dioxide (CO2) 30 20 - 32 mmol/L    Anion Gap 6 3 - 14 mmol/L    Urea Nitrogen 21 7 - 30 mg/dL    Creatinine 0.86 0.66 - 1.25 mg/dL    Calcium 9.3 8.5 - 10.1 mg/dL    Glucose 83 70 - 99 mg/dL    Alkaline Phosphatase 85 40 - 150 U/L    AST 15 0 - 45 U/L    ALT 25 0 - 70 U/L    Protein Total 7.8 6.8 - 8.8 g/dL    Albumin 4.0 3.4 - 5.0 g/dL    Bilirubin Total 0.3 0.2 - 1.3 mg/dL    GFR Estimate >90 >60 mL/min/1.73m2   Troponin I   Result Value Ref Range    Troponin I High Sensitivity <3 <79 ng/L   D  dimer quantitative   Result Value Ref Range    D-Dimer Quantitative 0.30 0.00 - 0.50 ug/mL FEU    Narrative    This D-dimer assay is intended for use in conjunction with a clinical pretest probability assessment model to exclude pulmonary embolism (PE) and deep venous thrombosis (DVT) in outpatients suspected of PE or DVT. The cut-off value is 0.50 ug/mL FEU.   CBC with platelets and differential   Result Value Ref Range    WBC Count 8.1 4.0 - 11.0 10e3/uL    RBC Count 4.90 4.40 - 5.90 10e6/uL    Hemoglobin 14.2 13.3 - 17.7 g/dL    Hematocrit 43.9 40.0 - 53.0 %    MCV 90 78 - 100 fL    MCH 29.0 26.5 - 33.0 pg    MCHC 32.3 31.5 - 36.5 g/dL    RDW 14.3 10.0 - 15.0 %    Platelet Count 275 150 - 450 10e3/uL    % Neutrophils 66 %    % Lymphocytes 22 %    % Monocytes 7 %    % Eosinophils 1 %    % Basophils 2 %    % Immature Granulocytes 2 %    NRBCs per 100 WBC 0 <1 /100    Absolute Neutrophils 5.4 1.6 - 8.3 10e3/uL    Absolute Lymphocytes 1.8 0.8 - 5.3 10e3/uL    Absolute Monocytes 0.6 0.0 - 1.3 10e3/uL    Absolute Eosinophils 0.1 0.0 - 0.7 10e3/uL    Absolute Basophils 0.1 0.0 - 0.2 10e3/uL    Absolute Immature Granulocytes 0.2 <=0.4 10e3/uL    Absolute NRBCs 0.0 10e3/uL   Cervical spine CT w/o contrast    Narrative    CT OF THE CERVICAL SPINE WITHOUT CONTRAST   12/20/2022 3:25 PM     COMPARISON: CT cervical spine 2/7/2022.    HISTORY: Head injury, Syncope. Neck pain. Mild/moderate trauma. None  of the following: Spondyloarthropathy, cervical x-ray with negative  result, questionable finding, or inadequate coverage.     TECHNIQUE: Axial images of the cervical spine were acquired without  intravenous contrast. Multiplanar reformations were created.        Impression    IMPRESSION: There is normal alignment of the cervical vertebrae;  however, there is reversal of normal cervical lordosis centered at the  C4-C5 level. Vertebral body heights of the cervical spine are normal.  Craniocervical alignment is normal. There are  no fractures of the  cervical spine. No spinal canal stenosis. No prevertebral soft tissue  swelling.      Radiation dose for this scan was reduced using automated exposure  control, adjustment of the mA and/or kV according to patient size, or  iterative reconstruction technique.    THOMAS CLARK MD         SYSTEM ID:  [id number]   Head CT w/o contrast    Narrative    CT OF THE HEAD WITHOUT CONTRAST 12/20/2022 3:25 PM     COMPARISON: 5/28/2018.    HISTORY: Head injury, syncope.    TECHNIQUE: Axial CT images of the head from the skull base to the  vertex were acquired without IV contrast.    FINDINGS: The ventricles and basal cisterns are within normal limits  in configuration. There is no midline shift. There are no extra-axial  fluid collections.  Gray-white differentiation is well maintained.    No intracranial hemorrhage, mass or recent infarct.    The visualized paranasal sinuses are well-aerated. There is no  mastoiditis. There are no fractures of the visualized bones.      Impression    IMPRESSION:  Normal head CT.    Radiation dose for this scan was reduced using automated exposure  control, adjustment of the mA and/or kV according to patient size, or  iterative reconstruction technique.      THOMAS CLARK MD         SYSTEM ID:  [id number]       Medications   0.9% sodium chloride BOLUS (1,000 mLs Intravenous New Bag 12/20/22 1451)     Followed by   sodium chloride 0.9% infusion (has no administration in time range)       Assessments & Plan (with Medical Decision Making)  22 yr old with an episode of syncope  No preceeding palpitations but did have preceeding dizziness.  No chest pain or sob.  Headache and neck pain therefore CT head and c spine ordered.    Labs including troponin, ddimer,  Cbc, bmp done.  Patient given a liter of ivf.  Patient did have some elevation of blood pressure and dizziness with standing up during the orthostatic blood pressure and pulse.  His blood pressure did not drop.  He did feel  some dizziness.  After the IV fluids he is feeling better.  I reviewed the results of the tests and I think he stable for discharge home.  D-dimer and troponin are normal.  ECG with J-point elevation but no clear etiology for his syncope.  He does have somewhat of a short SC.  Should he develop palpitations or cardiac symptoms he will need further evaluation including possible echocardiogram, Holter monitor.  He can follow-up with primary care for this.  The differential diagnosis, treatment options, risks and follow-up discussed with the patient and his mother who agree with the plan.       I have reviewed the nursing notes.    I have reviewed the findings, diagnosis, plan and need for follow up with the patient.      New Prescriptions    No medications on file       Final diagnoses:   Syncope, unspecified syncope type   Concussion with loss of consciousness, initial encounter       12/20/2022   [facility] EMERGENCY DEPT     Maurice Schaeffer MD  12/20/22 0110

## 2022-12-21 NOTE — Clinical Note
RAFAELA on your primary patient - thanks!Elizabeth Aquino  [[id number]] : Ms. CORTES is a 24 year old female with a history of asthma, allergies, s/p COVID-19 8/2021, 12/2021 presenting to the office today for initial pulmonary evaluation. 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 ([employer])\par -she denies exposure in college dorm worsened asthma\par -s/p COVID-19 8/2021, 12/2021\par -s/p COVID-19 vaccine x3 \par -she notes severe Sx during first bout of COVID-19 (difficulty breathing)\par -she notes persistent PND\par -she notes weight is stable \par -she notes poor quality of sleep due to stress\par -she notes sleep is good at the moment\par -she denies snoring\par -she notes restless sleep\par -she notes vision is stable \par -she notes her senses of smell and taste are stable \par -she notes bowels are regular \par -she notes reflux depending on diet\par \par -she denies any headaches, nausea, vomiting, fever, chills, chest pain, chest pressure, wheezing, palpitations, constipation, diarrhea, dizziness, dysphagia, heartburn, itchy eyes, itchy ears, leg swelling, leg pain, arthralgias, or sour taste in the mouth.

## 2023-01-23 ENCOUNTER — HOSPITAL ENCOUNTER (EMERGENCY)
Facility: CLINIC | Age: 23
Discharge: HOME | End: 2023-01-23
Attending: EMERGENCY MEDICINE | Admitting: EMERGENCY MEDICINE
Payer: COMMERCIAL

## 2023-01-23 VITALS
WEIGHT: 115 LBS | SYSTOLIC BLOOD PRESSURE: 117 MMHG | BODY MASS INDEX: 19.14 KG/M2 | RESPIRATION RATE: 16 BRPM | DIASTOLIC BLOOD PRESSURE: 56 MMHG | OXYGEN SATURATION: 98 % | HEART RATE: 90 BPM | TEMPERATURE: 98.4 F

## 2023-01-23 DIAGNOSIS — F10.10 ALCOHOL ABUSE: ICD-10-CM

## 2023-01-23 DIAGNOSIS — F19.10 SUBSTANCE ABUSE (H): ICD-10-CM

## 2023-01-23 PROCEDURE — 99283 EMERGENCY DEPT VISIT LOW MDM: CPT | Performed by: EMERGENCY MEDICINE

## 2023-01-23 PROCEDURE — 99282 EMERGENCY DEPT VISIT SF MDM: CPT | Performed by: EMERGENCY MEDICINE

## 2023-01-23 ASSESSMENT — ACTIVITIES OF DAILY LIVING (ADL): ADLS_ACUITY_SCORE: 33

## 2023-01-23 NOTE — ED TRIAGE NOTES
Pt trying to get into a treatment program,but they want him to detox.         Triage Assessment     Row Name 01/23/23 1221       Triage Assessment (Adult)    Airway WDL WDL       Respiratory WDL    Respiratory WDL WDL

## 2023-01-23 NOTE — DISCHARGE INSTRUCTIONS
Per [facility], you are approved to be admitted to their facility.  You just need to call to schedule a time for admission.  Most specifically, you cannot be intoxicated when you arrive.    Good [place]!!

## 2023-01-24 NOTE — ED PROVIDER NOTES
History     Chief Complaint   Patient presents with     Alcohol Problem     HPI  History per patient, medical records, mom    This is a 22-year-old male, history as below, presenting with concerns regarding alcohol and drug use.  Patient is trying to get into a residential treatment plan through [facility].  Apparently he is supposed to have some kind of medical clearance or he may need to go to detox prior to being admitted.  Patient is agreeable and willing to go to treatment.  He is frustrated currently because it is not going smoothly.  He has been using cocaine, the last was at the beginning of the month.  He does not inject it.  He also drinks alcohol.  Last alcohol use was a beer last night.  Otherwise he states he drinks about a liter of whiskey per week.  He also uses marijuana, last used was this morning.  He smokes this.  Patient has never been hospitalized or in detox or rehab before.  He has had some tremors and shaking when he does not drink in the past, no seizures.  He denies any recent illnesses, fevers, chills, chest pain, shortness of breath, nausea, vomiting, bowel or bladder changes.  No COVID exposure.    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        HLA-B27 positive 09/02/2016     Priority: Medium     NSAID long-term use 09/02/2016     Priority: Medium     DAVIAN (generalized anxiety disorder) 05/03/2016     Priority: Medium      Adjustment disorder with depressed mood 2016     Priority: Medium     Intermittent asthma, uncomplicated 2016     Priority: Medium     Delayed puberty 2014     Priority: Medium     Seasonal allergic rhinitis 10/29/2013     Priority: Medium     Learning disability 2011     Priority: Medium        Past Medical History:    Past Medical History:   Diagnosis Date     Asthma, intermittent      Cyclical vomiting age 8y     Learning disability      Von Willebrand disease        Past Surgical History:    Past Surgical History:   Procedure Laterality Date     BIOPSY OF SKIN LESION      mole removal     ESOPHAGOSCOPY, GASTROSCOPY, DUODENOSCOPY (EGD), COMBINED  2014    Procedure: COMBINED ESOPHAGOSCOPY, GASTROSCOPY, DUODENOSCOPY (EGD), BIOPSY SINGLE OR MULTIPLE;  EGD, vomiting;  Surgeon: Leo Chapman MD;  Location: [facility] OR     MYRINGOTOMY, INSERT TUBE BILATERAL, COMBINED Bilateral 2018    Procedure: COMBINED MYRINGOTOMY, INSERT TUBE BILATERAL;  Bilateral myringotomy with tubes;  Surgeon: Rick Watson MD;  Location: [facility] OR     PE tubes in B ears x 2      tubes out     pyloric stenosis         Family History:    Family History   Problem Relation Age of Onset     Other - See Comments Mother         mother 5 ft 1in with menarche at age 15 years;  mother is adopted, her biological parents were related (parent-child)/Concern for genetic syndrome, never worked up fully. Born with 3 toes on each foot.     Other - See Comments Father         unsure height, 5 feet 7 in est     Bipolar Disorder Maternal Grandmother      Bipolar Disorder Maternal Aunt      Bipolar Disorder Other      Schizophrenia Other      Diabetes Other         Maternal great grandfather     Other - See Comments Other         Lupus-- paternal side half brothers     Other - See Comments Other         paternal side half brother,  congenital syndrome at age 1y     Ulcerative Colitis Other         Maternal great grandmother        Social History:  Marital Status:  Single [1]  Social History     Tobacco Use     Smoking status: Some Days     Packs/day: 0.25     Types: Cigarettes     Smokeless tobacco: Current     Types: Chew   Vaping Use     Vaping Use: Every day     Substances: Nicotine, Flavoring     Devices: Pre-filled or refillable cartridge, Refillable tank   Substance Use Topics     Alcohol use: Yes     Alcohol/week: 0.0 standard drinks     Comment: occ     Drug use: Yes     Types: Marijuana        Medications:    acetaminophen (TYLENOL) 500 MG tablet  albuterol (PROAIR HFA/PROVENTIL HFA/VENTOLIN HFA) 108 (90 Base) MCG/ACT inhaler  omeprazole (PRILOSEC) 20 MG DR capsule          Review of Systems   All other ROS reviewed and are negative or non-contributory except as stated in HPI.     Physical Exam   BP: 117/56  Pulse: 90  Temp: 98.4  F (36.9  C)  Resp: 16  Weight: 52.2 kg (115 lb)  SpO2: 98 %      Physical Exam  Vitals and nursing note reviewed.   Constitutional:       Comments: Young thin male sitting in chair.  Healthy-appearing.   HENT:      Head: Normocephalic.   Eyes:      Extraocular Movements: Extraocular movements intact.   Cardiovascular:      Rate and Rhythm: Normal rate.   Pulmonary:      Effort: Pulmonary effort is normal.   Musculoskeletal:         General: Normal range of motion.      Cervical back: Normal range of motion and neck supple.   Neurological:      General: No focal deficit present.      Mental Status: He is alert and oriented to person, place, and time.   Psychiatric:      Comments: Patient mildly agitated/frustrated         ED Course (with Medical Decision Making)    Pt seen and examined by me.  RN and EPIC notes reviewed.       Patient presenting for clearance to be admitted to a residential treatment facility.  I do not think there is any specific need for him to go to a detox facility.  I did call [facility].  They note the patient is approved for admission and just needs to call to make the  appointment for intake.    This was relayed to the patient and his mom.  They are making the call in the room.  Patient discharged in stable condition.  Return for concerns.         Procedures  No results found for this or any previous visit (from the past 24 hour(s)).    Medications - No data to display    Assessments & Plan      I have reviewed the findings, diagnosis, plan and need for follow up with the patient.    Discharge Medication List as of 1/23/2023 12:59 PM          Final diagnoses:   Substance abuse (H)   Alcohol abuse     Disposition: Patient discharged home in stable condition.  Plan as above.  Return for concerns.     Note: Chart documentation done in part with Dragon Voice Recognition software. Although reviewed after completion, some word and grammatical errors may remain.   1/23/2023   [facility] EMERGENCY DEPT     Coreen Gaona MD  01/24/23 5133

## 2023-01-25 ENCOUNTER — DOCUMENTATION ONLY (OUTPATIENT)
Dept: OTHER | Facility: CLINIC | Age: 23
End: 2023-01-25
Payer: COMMERCIAL

## 2023-01-25 ENCOUNTER — TELEPHONE (OUTPATIENT)
Dept: BEHAVIORAL HEALTH | Facility: CLINIC | Age: 23
End: 2023-01-25
Payer: COMMERCIAL

## 2023-01-25 NOTE — TELEPHONE ENCOUNTER
[facility] attempted to call patient for [facility] offer following up on ED/IHP alert. Patient does not have a voicemail box set up. [facility] follow up with a [employer] message to patient.     Violet Paulino [facility]  [facility]   [facility]  [phone]

## 2023-03-07 ENCOUNTER — HOSPITAL ENCOUNTER (EMERGENCY)
Facility: CLINIC | Age: 23
Discharge: HOME | End: 2023-03-07
Attending: FAMILY MEDICINE | Admitting: FAMILY MEDICINE
Payer: COMMERCIAL

## 2023-03-07 VITALS
TEMPERATURE: 98.4 F | OXYGEN SATURATION: 93 % | WEIGHT: 126 LBS | BODY MASS INDEX: 20.97 KG/M2 | HEART RATE: 79 BPM | RESPIRATION RATE: 20 BRPM | DIASTOLIC BLOOD PRESSURE: 77 MMHG | SYSTOLIC BLOOD PRESSURE: 132 MMHG

## 2023-03-07 DIAGNOSIS — R06.2 WHEEZING: ICD-10-CM

## 2023-03-07 DIAGNOSIS — J06.9 UPPER RESPIRATORY TRACT INFECTION, UNSPECIFIED TYPE: ICD-10-CM

## 2023-03-07 PROCEDURE — 94640 AIRWAY INHALATION TREATMENT: CPT

## 2023-03-07 PROCEDURE — 250N000009 HC RX 250: Performed by: FAMILY MEDICINE

## 2023-03-07 PROCEDURE — 99284 EMERGENCY DEPT VISIT MOD MDM: CPT | Performed by: FAMILY MEDICINE

## 2023-03-07 PROCEDURE — 99284 EMERGENCY DEPT VISIT MOD MDM: CPT | Mod: 25 | Performed by: FAMILY MEDICINE

## 2023-03-07 RX ORDER — IPRATROPIUM BROMIDE AND ALBUTEROL SULFATE 2.5; .5 MG/3ML; MG/3ML
3 SOLUTION RESPIRATORY (INHALATION) ONCE
Status: COMPLETED | OUTPATIENT
Start: 2023-03-07 | End: 2023-03-07

## 2023-03-07 RX ORDER — PREDNISONE 20 MG/1
20 TABLET ORAL 2 TIMES DAILY
Qty: 10 TABLET | Refills: 0 | Status: SHIPPED | OUTPATIENT
Start: 2023-03-07 | End: 2023-03-12

## 2023-03-07 RX ORDER — ALBUTEROL SULFATE 90 UG/1
2 AEROSOL, METERED RESPIRATORY (INHALATION) EVERY 6 HOURS PRN
Qty: 18 G | Refills: 0 | Status: SHIPPED | OUTPATIENT
Start: 2023-03-07

## 2023-03-07 RX ADMIN — IPRATROPIUM BROMIDE AND ALBUTEROL SULFATE 3 ML: 2.5; .5 SOLUTION RESPIRATORY (INHALATION) at 23:31

## 2023-03-07 ASSESSMENT — ACTIVITIES OF DAILY LIVING (ADL): ADLS_ACUITY_SCORE: 35

## 2023-03-08 RX ORDER — ALBUTEROL SULFATE 90 UG/1
2 AEROSOL, METERED RESPIRATORY (INHALATION) EVERY 6 HOURS PRN
Status: DISCONTINUED | OUTPATIENT
Start: 2023-03-08 | End: 2023-03-08 | Stop reason: HOSPADM

## 2023-03-08 NOTE — ED PROVIDER NOTES
[facility] ED Provider Note   Patient: Cathi Gu  MRN #:  [id number]  Date of Visit: March 7, 2023    CC:     Chief Complaint   Patient presents with     Shortness of Breath     HPI:  Cathi Gu is a 22 year old male who presented to the emergency department with 2-day history of wheezing, with nonproductive cough and only occasional clear sputum.  Patient has a history of intermittent asthma, with the last exacerbation about 4 years ago.  Patient denies fever, chills, cough.  He has some chest tightness.  He is post to have an inhaler but lost it.  Patient is accompanied by his mother Kristen.  Patient is a smoker currently of 1/2 pack/day.  Patient states that he had COVID-19 infection in early February.    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        HLA-B27 positive 09/02/2016     Priority: Medium     NSAID long-term use 09/02/2016     Priority: Medium     DAVIAN (generalized anxiety disorder) 05/03/2016     Priority: Medium     Adjustment disorder with depressed mood 05/03/2016     Priority: Medium     Intermittent asthma, uncomplicated 03/11/2016     Priority: Medium     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     Priority: Medium     Seasonal allergic rhinitis 10/29/2013     Priority: Medium     Learning disability 09/28/2011     Priority: Medium       Past Medical History:   Diagnosis Date     Asthma, intermittent      Cyclical vomiting age 8y     Learning disability      Von Willebrand  disease 2015       MEDS: albuterol (VENTOLIN HFA) 108 (90 Base) MCG/ACT inhaler  predniSONE (DELTASONE) 20 MG tablet  acetaminophen (TYLENOL) 500 MG tablet  albuterol (PROAIR HFA/PROVENTIL HFA/VENTOLIN HFA) 108 (90 Base) MCG/ACT inhaler  omeprazole (PRILOSEC) 20 MG DR capsule        ALLERGIES:    Allergies   Allergen Reactions     Augmentin Rash     Penicillins Rash       Past Surgical History:   Procedure Laterality Date     BIOPSY OF SKIN LESION  2008    mole removal     ESOPHAGOSCOPY, GASTROSCOPY, DUODENOSCOPY (EGD), COMBINED  4/9/2014    Procedure: COMBINED ESOPHAGOSCOPY, GASTROSCOPY, DUODENOSCOPY (EGD), BIOPSY SINGLE OR MULTIPLE;  EGD, vomiting;  Surgeon: Leo Chapman MD;  Location: [facility] OR     MYRINGOTOMY, INSERT TUBE BILATERAL, COMBINED Bilateral 9/11/2018    Procedure: COMBINED MYRINGOTOMY, INSERT TUBE BILATERAL;  Bilateral myringotomy with tubes;  Surgeon: Rick Watson MD;  Location: [facility] OR     PE tubes in B ears x 2      tubes out     pyloric stenosis         Social History     Tobacco Use     Smoking status: Some Days     Packs/day: 0.25     Types: Cigarettes     Smokeless tobacco: Current     Types: Chew   Vaping Use     Vaping Use: Every day     Substances: Nicotine, Flavoring     Devices: Pre-filled or refillable cartridge, Refillable tank   Substance Use Topics     Alcohol use: Yes     Alcohol/week: 0.0 standard drinks     Comment: occ     Drug use: Yes     Types: Marijuana         Review of Systems   Except as noted in HPI, all other systems were reviewed and are negative    Physical Exam     Vitals were reviewed  Patient Vitals for the past 8 hrs:   BP Temp Temp src Pulse Resp SpO2 Weight   03/07/23 2308 -- 98.4  F (36.9  C) Oral -- -- -- --   03/07/23 2306 132/77 -- Oral 79 20 93 % 57.2 kg (126 lb)     GENERAL APPEARANCE: Alert and oriented x3, mild respiratory difficulty  FACE: normal facies  EYES: Pupils are equal  HENT: normal external exam; oropharynx is noninjected  NECK: no adenopathy or  asymmetry  RESP: Tight bilateral expiratory wheezes  CV: regular rate and rhythm; no significant murmurs, gallops or rubs  ABD: soft, no tenderness; no rebound or guarding; bowel sounds are normal  MS: no gross deformities noted; normal muscle tone.  EXT: No calf tenderness or pitting edema  SKIN: no worrisome rash  NEURO: no facial droop; no focal deficits, speech is normal        Available Lab/Imaging Results   No results found for this or any previous visit (from the past 24 hour(s)).             Impression     Final diagnoses:   Upper respiratory tract infection   Wheezing         ED Course & Medical Decision Making   Cathi Gu is a 22 year old male who presented to the emergency department with 2-day history of upper respiratory symptoms, triggering his underlying asthma.  He has a lot of chest tightness and wheezing.  Patient is post to have an albuterol inhaler, but lost his inhaler.  Patient was seen shortly after arrival.  Temperature is 98.4, blood pressure 132/77, heart rate of 79, respiratory rate of 20 with 93% oxygen saturation.  On exam, patient has very mild respiratory distress at rest.  With deep inspiration expiration, he has tight wheezes bilaterally.  A DuoNeb was administered, and he was rechecked afterwards.  Patient is moving more air, but still has some bronchospasms.  Patient will begin albuterol 2 puffs every 2-4 hours tonight, and begin prednisone 20 mg twice a day for 5 days.    Follow-up in the clinic in 2 to 3 days if not improving.  Return to the emergency department at any time if symptoms worsen.  Patient expressed understanding and agreement with discharge instructions.        Written after-visit summary and instructions were given at the time of discharge.    Follow up Plan:   Your primary clinic provider    In 2 days  if not improving    [facility] Emergency Dept  [address] [place] [address] [phone]    If symptoms  worsen      Discharge Instructions:   You have an upper respiratory infection that is triggering your asthma.  Use your albuterol inhaler every 2-4 hours as needed.  Take prednisone 20 mg twice a day for 5 days.  Follow-up with your primary care provider if not improving in 2 to 3 days.  Return to the emergency department at any time if your symptoms worsen.       Disclaimer: This note consists of words and symbols derived from keyboarding and dictation using voice recognition software.  As a result, there may be errors that have gone undetected.  Please consider this when interpreting information found in this note.       Lorie Santana MD  03/07/23 6244

## 2023-03-08 NOTE — DISCHARGE INSTRUCTIONS
You have an upper respiratory infection that is triggering your asthma.  Use your albuterol inhaler every 2-4 hours as needed.  Take prednisone 20 mg twice a day for 5 days.  Follow-up with your primary care provider if not improving in 2 to 3 days.  Return to the emergency department at any time if your symptoms worsen.

## 2023-03-08 NOTE — ED TRIAGE NOTES
Pt reports chills, cough, generalized body aches beginning today. Hx of asthma. I should use my inhaler but I lost it in October. 02 sat 93% on RA. Ins/exp wheeze. Productive cough, clear phlegm.      Triage Assessment     Row Name 03/07/23 9086       Triage Assessment (Adult)    Airway WDL WDL       Respiratory WDL    Respiratory WDL X;rhythm/pattern       Skin Circulation/Temperature WDL    Skin Circulation/Temperature WDL WDL       Cardiac WDL    Cardiac WDL WDL       Peripheral/Neurovascular WDL    Peripheral Neurovascular WDL WDL       Cognitive/Neuro/Behavioral WDL    Cognitive/Neuro/Behavioral WDL WDL

## 2023-04-23 ENCOUNTER — HEALTH MAINTENANCE LETTER (OUTPATIENT)
Age: 23
End: 2023-04-23

## 2023-05-30 ENCOUNTER — PATIENT OUTREACH (OUTPATIENT)
Dept: CARE COORDINATION | Facility: CLINIC | Age: 23
End: 2023-05-30
Payer: COMMERCIAL

## 2023-05-30 NOTE — PROGRESS NOTES
Clinical Product Navigator RN reviewed chart; patient on payer product coverage.  Review results:   CPN Initial Information Gathering  Referral Source: Pro-Active Outreach    Patient identified due to 6 ED visits and no PCP.      RN Clinical Product Navigator contacted patient who was unable to talk at time of call and confirmed he does not have a regular PCP.  Patient was receptive to a call from writer on 6/2/23.    Melissa Behl BSN, RN, PHN, CCM  RN Clinical Product Navigator  Ph: [phone]

## 2023-06-02 SDOH — ECONOMIC STABILITY: FOOD INSECURITY: WITHIN THE PAST 12 MONTHS, YOU WORRIED THAT YOUR FOOD WOULD RUN OUT BEFORE YOU GOT MONEY TO BUY MORE.: NEVER TRUE

## 2023-06-02 SDOH — ECONOMIC STABILITY: FOOD INSECURITY: WITHIN THE PAST 12 MONTHS, THE FOOD YOU BOUGHT JUST DIDN'T LAST AND YOU DIDN'T HAVE MONEY TO GET MORE.: NEVER TRUE

## 2023-06-02 SDOH — ECONOMIC STABILITY: TRANSPORTATION INSECURITY
IN THE PAST 12 MONTHS, HAS LACK OF TRANSPORTATION KEPT YOU FROM MEETINGS, WORK, OR FROM GETTING THINGS NEEDED FOR DAILY LIVING?: NO

## 2023-06-02 SDOH — ECONOMIC STABILITY: INCOME INSECURITY: IN THE LAST 12 MONTHS, WAS THERE A TIME WHEN YOU WERE NOT ABLE TO PAY THE MORTGAGE OR RENT ON TIME?: NO

## 2023-06-02 SDOH — ECONOMIC STABILITY: TRANSPORTATION INSECURITY
IN THE PAST 12 MONTHS, HAS THE LACK OF TRANSPORTATION KEPT YOU FROM MEDICAL APPOINTMENTS OR FROM GETTING MEDICATIONS?: NO

## 2023-06-02 ASSESSMENT — SOCIAL DETERMINANTS OF HEALTH (SDOH): HOW HARD IS IT FOR YOU TO PAY FOR THE VERY BASICS LIKE FOOD, HOUSING, MEDICAL CARE, AND HEATING?: NOT VERY HARD

## 2023-06-02 ASSESSMENT — ACTIVITIES OF DAILY LIVING (ADL): DEPENDENT_IADLS:: INDEPENDENT

## 2023-06-02 NOTE — PROGRESS NOTES
Clinic Care Coordination Contact    Clinic Care Coordination Contact  OUTREACH    Referral Information:  Referral Source: Pro-Active Outreach    Primary Diagnosis: CD    Chief Complaint   Patient presents with     Clinic Care Coordination - Initial        Universal Utilization:   Clinic Utilization  Difficulty keeping appointments:: Yes  Compliance Concerns: Yes  No-Show Concerns: No  No PCP office visit in Past Year: No  Utilization    Hospital Admissions  0             ED Visits  6             No Show Count (past year)  0                Current as of: 6/2/2023  8:47 AM              Clinical Concerns:  Current Medical Concerns:  RN Clinical Product Navigator assisted patient in scheduling and establish care and AWV on 6/19/23.     Patient Active Problem List   Diagnosis     Learning disability     Seasonal allergic rhinitis     Delayed puberty     Intermittent asthma, uncomplicated     DAVIAN (generalized anxiety disorder)     Adjustment disorder with depressed mood     Musculoskeletal pain     HLA-B27 positive     NSAID long-term use     Suicidal ideation     Mild recurrent major depression (H), with anxious distress     Dysfunction of both eustachian tubes     Hx of tympanostomy tubes     Von Willebrand's disease (H)        Current Behavioral Concerns: Patient reports he needs assistance in finding an outpatient chemical dependency treatment program.  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  Patient currently has an Interlock for his car and is working on getting his license back.    RN Clinical Product Navigator contacted [facility] with patient.  Patient was sent a list of Outpatient Treatment Services and was referred to their Behavioral Health Care Management service.  RN Clinical Product Navigator noted RONI CALVILLO attempted  to contact patient in January 2023, writer updated [facility] to inquire if patient would still be eligible for their services.    Education Provided to patient: RN Clinical Product Navigator educated patient on RN Clinical Product Navigator role, reviewed open care gaps, importance of having a PCP, health plan benefits to assist with outpatient treatment program.  Pain  Pain (GOAL):: No  Health Maintenance Reviewed: Due/Overdue   Health Maintenance Due   Topic Date Due     COVID-19 Vaccine (1) Never done     Pneumococcal Vaccine: Pediatrics (0 to 5 Years) and At-Risk Patients (6 to 64 Years) (1 - PCV) 09/08/2008     HPV IMMUNIZATION (1 - Male 2-dose series) Never done     YEARLY PREVENTIVE VISIT  02/18/2020     HEPATITIS C SCREENING  Never done     ASTHMA ACTION PLAN  01/21/2021     DAVIAN ASSESSMENT  03/15/2023     ASTHMA CONTROL TEST  03/15/2023     PHQ-9  03/15/2023      Clinical Pathway: None    Medication Management:  Medication review status: not on regular medications       Functional Status:  Dependent ADLs:: Independent  Dependent IADLs:: Independent  Bed or wheelchair confined:: No  Mobility Status: Independent  Fallen 2 or more times in the past year?: No  Any fall with injury in the past year?: No    Living Situation:  Current living arrangement:: I live in a private home  Type of residence:: Private home - stairs    Lifestyle & Psychosocial Needs:    Social Determinants of Health     Tobacco Use: High Risk (12/20/2022)    Patient History      Smoking Tobacco Use: Some Days      Smokeless Tobacco Use: Current      Passive Exposure: Not on file   Alcohol Use: Not on file   Financial Resource Strain: Low Risk  (6/2/2023)    Overall Financial Resource Strain (CARDIA)      Difficulty of Paying Living Expenses: Not very hard   Food Insecurity: No Food Insecurity (6/2/2023)    Hunger Vital Sign      Worried About Running Out of Food in the Last Year: Never true      Ran Out of Food in the Last Year: Never true    Transportation Needs: No Transportation Needs (6/2/2023)    PRAPARE - Transportation      Lack of Transportation (Medical): No      Lack of Transportation (Non-Medical): No   Physical Activity: Not on file   Stress: Not on file   Social Connections: Not on file   Intimate Partner Violence: Not on file   Depression: Not at risk (9/15/2022)    PHQ-2      PHQ-2 Score: 1   Housing Stability: Low Risk  (6/2/2023)    Housing Stability Vital Sign      Unable to Pay for Housing in the Last Year: No      Number of Places Lived in the Last Year: 2      Unstable Housing in the Last Year: No     Diet:: Regular  Inadequate nutrition (GOAL):: No  Tube Feeding: No  Inadequate activity/exercise (GOAL):: No  Significant changes in sleep pattern (GOAL): No  Transportation means:: Regular car     Church or spiritual beliefs that impact treatment:: No  Mental health DX:: Yes  Mental health DX how managed:: None  Mental health management concern (GOAL):: Yes  Chemical Dependency Status: Current Concern  Informal Support system:: Significant other             Resources and Interventions:  Current Resources:      Community Resources: None  Supplies Currently Used at Home: None  Equipment Currently Used at Home: none  Employment Status: employed full-time         Advance Care Plan/Directive  Advanced Care Plans/Directives on file:: No    Referrals Placed: CD         Care Plan:  Care Plan: Substance Use     Problem: Substance Use     Goal: Improve substance use status; get into outpatient treatment program     Note:     Barriers: work schedule   Strengths: motivated  Patient expressed understanding of goal: yes  Action steps to achieve this goal:  1. I will work with [employer]  on Outpatient Treatment Program placement.  2. I will attend my preventative care visit with Dr. Denton.                          Patient/Caregiver understanding: Yes    Outreach Frequency: weekly  Future Appointments              In 2 weeks Sanford Denton  MD [facility]          Plan:   1. Patient will await call from [facility]  to assist with Outpatient SAMMY treatment.  2. Patient will attend future PCP appointment.  3. RN Clinical Product Navigator will follow-up with patient next week.    Melissa Behl BSN, RN, PHN, CCM  RN Clinical Product Navigator  [phone]

## 2023-06-05 NOTE — NURSING NOTE
Health Maintenance Due   Topic Date Due     HPV IMMUNIZATION (1 of 3 - Male 3 Dose Series) 09/08/2013     HIV SCREEN (SYSTEM ASSIGNED)  09/08/2020       Health Maintenance reviewed at today's visit patient asked to schedule/complete:   Patient is aware.    Marjorie Leavitt, CMA       
The risks for colonoscopy were discussed with her and she agrees to proceed.
Hold Brilinta 3 days, once we have clearance to do so.
The bowel prep was prescribed and instructions were given to her.

## 2023-06-07 ENCOUNTER — PATIENT OUTREACH (OUTPATIENT)
Dept: CARE COORDINATION | Facility: CLINIC | Age: 23
End: 2023-06-07
Payer: COMMERCIAL

## 2023-06-07 NOTE — PROGRESS NOTES
RN Clinical Product Navigator contacted patient for follow-up.  Patient requested writer to call him later today.    Melissa Behl BSN, RN, PHN, CCM  RN Clinical Product Navigator  [phone]

## 2023-06-07 NOTE — PROGRESS NOTES
Clinic Care Coordination Contact    Follow Up Progress Note      Assessment: Patient has not received a call from [facility] .  Patient states he had a virtual mental health diagnostic assessment with [facility] today, however, he was unable to complete it due to working in [place].    Patient states he needs a mental health diagnostic assessment as well as an outpatient treatment program that is flexible with his work schedule and location; virtual preferred.    Care Gaps:    Health Maintenance Due   Topic Date Due     COVID-19 Vaccine (1) Never done     Pneumococcal Vaccine: Pediatrics (0 to 5 Years) and At-Risk Patients (6 to 64 Years) (1 - PCV) 09/08/2008     HPV IMMUNIZATION (1 - Male 2-dose series) Never done     YEARLY PREVENTIVE VISIT  02/18/2020     HEPATITIS C SCREENING  Never done     ASTHMA ACTION PLAN  01/21/2021     DAVIAN ASSESSMENT  03/15/2023     ASTHMA CONTROL TEST  03/15/2023     PHQ-9  03/15/2023       Care Gaps Last addressed on previous outreach    Care Plans  Care Plan: Substance Use     Problem: Substance Use     Goal: Improve substance use status; get into outpatient treatment program     This Visit's Progress: 0%    Note:     Barriers: work schedule   Strengths: motivated  Patient expressed understanding of goal: yes  Action steps to achieve this goal:  1. I will work with [facility]  on Outpatient Treatment Program placement.  2. I will attend my preventative care visit with Dr. Denton.                          Intervention/Education provided during outreach: RN Clinical Product Navigator contacted [facility]; there has not been a  assigned to patient's case yet.  Another message has been sent with patient's request.  RN Clinical Product Navigator outreached to treatment program [facility] noting they have evening and weekend hours, message left with their  to inquire if they would be able to accommodate patient.     Outreach Frequency:  weekly        Plan:   1. Patient awaiting [employer] case management assignment.  2. RN Clinical Product Navigator will await call back from [facility] as potential option for patient.    Melissa Behl BSN, RN, PHN, [facility]  RN Clinical Product Navigator  [phone]

## 2023-06-08 NOTE — PROGRESS NOTES
Patient provided update; writer continues to await call back from [facility].  Writer received update from [facility] SW: Insurance in [facility] is showing that his MA is inactive which makes him ineligible for [facility] care coordination. A referral can be placed for Primary Care Coordination.     Patient was reviewed with [facility] Care Team: There are going to be barriers to getting him outpatient treatment if he is not able to adjust his hours for work. The only evening programs for treatment start at 6pm and are in person. There are virtual options during the day but he would he would have to be in the state of [place] (versus working in [place]) when he attends virtual programs. There is a [facility] that works in the [facility].    RN Clinical Product Navigator discussed with RAJINDER CC for [facility] who recommended FR referral for MA renewal and for patient to contact the [facility] for a Rule 25 assessment:   [email or web address]  [facility] - Phone: [phone]  [facility] Phone: [phone]    RN Clinical Product Navigator spoke with patient who states he completed all [facility] MA applications 1-2 weeks ago with his mom.  Patient requested writer to e-mail him the [facility] contacts for the Rule 25 assessment.  Writer communicated the risks of unencrypted electronic communication and the patient and/or patient representative has agreed to accept the risks and receive unencrypted communication related to the information or resources we have discussed. We reviewed that no PHI will be included.  Email address verified with the patient.       COLETTE Orellana Clinical Navigator  [phone]      Melissa Behl BSN, RN, PHN, [facility]  RN Clinical Product Navigator  [phone]

## 2023-06-09 NOTE — PROGRESS NOTES
Patient left a voicemail for writer 6/8/23.  RN Clinical Product Navigator returned patient's call.  Patient informed writer he and his mom performed a conference call to [phone] and received confirmation that his medical assistance is active.    Patient has not contacted resources for Rule 25 assessment as provided yesterday by writer.  Patient provided additional resource of [facility]: [phone]  Outpatient Treatment & Recovery Services  [facility] ([facility])    Patient states he is also looking into [facility] in [place], [place]   [email or web address]    RN Clinical Product Navigator will follow-up with patient next week.    Melissa Behl BSN, RN, PHN, CCM  RN Clinical Product Navigator  [phone]

## 2023-06-12 ENCOUNTER — PATIENT OUTREACH (OUTPATIENT)
Dept: CARE COORDINATION | Facility: CLINIC | Age: 23
End: 2023-06-12
Payer: COMMERCIAL

## 2023-06-12 NOTE — PROGRESS NOTES
Clinic Care Coordination Contact  [facility]/Voicemail       Clinical Data: Care Coordinator Outreach  Outreach attempted x 1.  No voicemail.  Plan:  Care Coordinator will try to reach patient again in 3-5 business days.    Melissa Behl BSN, RN, PHN, [facility]  RN Clinical Product Navigator  [phone]

## 2023-06-13 NOTE — PROGRESS NOTES
"Clinic Care Coordination Contact    Follow Up Progress Note      Assessment: RN Clinical Product Navigator spoke with patient.  Patient states his  is allowing him to \"start over\" in pursuing substance use disorder treatment.  Patient states he called [facility] and was informed they are not offering virtual treatment at this time.Patient plans to call [employer] - Phone: [phone] [facility] Phone: [phone] later today.        Care Gaps:    Health Maintenance Due   Topic Date Due     COVID-19 Vaccine (1) Never done     Pneumococcal Vaccine: Pediatrics (0 to 5 Years) and At-Risk Patients (6 to 64 Years) (1 - PCV) 09/08/2008     HPV IMMUNIZATION (1 - Male 2-dose series) Never done     YEARLY PREVENTIVE VISIT  02/18/2020     HEPATITIS C SCREENING  Never done     ASTHMA ACTION PLAN  01/21/2021     DAVIAN ASSESSMENT  03/15/2023     ASTHMA CONTROL TEST  03/15/2023     PHQ-9  03/15/2023       Care Gaps Last addressed on previous encounter    Care Plans  Care Plan: Substance Use     Problem: Substance Use     Goal: Improve substance use status; get into outpatient treatment program     This Visit's Progress: 0% Recent Progress: 0%    Note:     Barriers: work schedule   Strengths: motivated  Patient expressed understanding of goal: yes  Action steps to achieve this goal:  1. I will work with [employer]  on Outpatient Treatment Program placement.  2. I will attend my preventative care visit with Dr. Denton.  3. I will call [employer] - Phone: [phone] and [facility] Phone: [phone] to request a Rule 25 assessment.                            Intervention/Education provided during outreach: RN Clinical Product Navigator reviewed current care plan and encouraged patient to contact [facility] and [facility].              Plan:   1. [facility] and [facility] to inquire about " Rule 25 assessment and treatment programs.  2. RN Clinical Product Navigator will follow-up with patient in 2-5 days.    Melissa Behl BSN, RN, PHN, CCM  RN Clinical Product Navigator  [phone]

## 2023-06-15 NOTE — PROGRESS NOTES
Patient sent writer an e-mail on 6/14/23 stating he has a future Rule 25 assessment with [employer] on 6/27/23.    Melissa Behl BSN, RN, PHN, CCM  RN Clinical Product Navigator  [phone]

## 2023-06-16 NOTE — PROGRESS NOTES
Patient contacted writer to state he has to work next week and needs to reschedule his preventative care visit with his PCP.  RN Clinical Product Navigator assisted patient in rescheduling his appointment for 7/10/23.    Melissa Behl BSN, RN, PHN, [facility]  RN Clinical Product Navigator  [phone]

## 2023-06-30 ENCOUNTER — PATIENT OUTREACH (OUTPATIENT)
Dept: CARE COORDINATION | Facility: CLINIC | Age: 23
End: 2023-06-30
Payer: COMMERCIAL

## 2023-06-30 NOTE — PROGRESS NOTES
Clinic Care Coordination Contact  Follow Up Progress Note      Assessment: RN Clinical Product Navigator spoke with patient who states he had a Rule-25 assessment completed at [facility] on 6/29/23.  The recommendation was Intensive Outpatient Treatment.  Patient states [facility] does not provide treatment in both [place] and [place], however, patient will be mainly working in [place] for the next 2 months after his current job.  Patient believes [facility] is helping him find an IOP.    Care Gaps:    Health Maintenance Due   Topic Date Due    COVID-19 Vaccine (1) Never done    Pneumococcal Vaccine: Pediatrics (0 to 5 Years) and At-Risk Patients (6 to 64 Years) (1 - PCV) 09/08/2008    HPV IMMUNIZATION (1 - Male 2-dose series) Never done    YEARLY PREVENTIVE VISIT  02/18/2020    HEPATITIS C SCREENING  Never done    ASTHMA ACTION PLAN  01/21/2021    DAVIAN ASSESSMENT  03/15/2023    ASTHMA CONTROL TEST  03/15/2023    PHQ-9  03/15/2023       Care Gaps Last addressed on previous encounter    Care Plans  Care Plan: Substance Use       Problem: Substance Use       Goal: Improve substance use status; get into outpatient treatment program       This Visit's Progress: 10% Recent Progress: 0%    Note:     Barriers: work schedule   Strengths: motivated  Patient expressed understanding of goal: yes  Action steps to achieve this goal:  1. I will work with [employer]  on Outpatient Treatment Program placement.  2. I will attend my preventative care visit with Dr. Denton.  3. I will call [facility] - Phone: [phone] and [facility] Phone: [phone] to request a Rule 25 assessment. Complete.                                  Intervention/Education provided during outreach: RN Clinical Product Navigator reviewed upcoming PCP visit for [facility].     Outreach Frequency: monthly        Plan:   Patient will work with [facility] to enroll in an IOP.  2. Patient will attend future [facility] on 7/10/23.  3. RN  Clinical Product Navigator  will follow up following patient's future PCP visit.    Melissa Behl BSN, RN, PHN, CCM  RN Clinical Product Navigator  [phone]

## 2023-06-30 NOTE — PROGRESS NOTES
Clinic Care Coordination Contact  [facility]/Voicemail       Clinical Data: Care Coordinator Outreach  Outreach attempted x 1.  No voicemail available.  Plan: Care Coordinator will try to reach patient again in approximately 10 business days.    Melissa Behl BSN, RN, PHN, [facility]  RN Clinical Product Navigator  [phone]

## 2023-07-12 ENCOUNTER — PATIENT OUTREACH (OUTPATIENT)
Dept: CARE COORDINATION | Facility: CLINIC | Age: 23
End: 2023-07-12

## 2023-07-12 NOTE — PROGRESS NOTES
Clinic Care Coordination Contact  Follow Up Progress Note      Assessment: RN Clinical Product Navigatorp spoke with patient who states he has not heard from [facility] since he completed his Rule 25 assessment on 6/29/23.    Care Gaps:    Health Maintenance Due   Topic Date Due    COVID-19 Vaccine (1) Never done    Pneumococcal Vaccine: Pediatrics (0 to 5 Years) and At-Risk Patients (6 to 64 Years) (1 - PCV) 09/08/2008    HPV IMMUNIZATION (1 - Male 2-dose series) Never done    YEARLY PREVENTIVE VISIT  02/18/2020    HEPATITIS C SCREENING  Never done    ASTHMA ACTION PLAN  01/21/2021    DAVIAN ASSESSMENT  03/15/2023    ASTHMA CONTROL TEST  03/15/2023    PHQ-9  03/15/2023       Care Gaps Last addressed on previous encounter    Care Plans  Care Plan: Substance Use       Problem: Substance Use       Goal: Improve substance use status; get into outpatient treatment program       This Visit's Progress: 10% Recent Progress: 10%    Note:     Barriers: work schedule   Strengths: motivated  Patient expressed understanding of goal: yes  Action steps to achieve this goal:  1. I will work with [facility]  on Outpatient Treatment Program placement.  2. I will attend my preventative care visit with Dr. Denton.  3. I will call [facility] - Phone: [phone] and [facility] Phone: [phone] to request a Rule 25 assessment. Complete.  4. I will enroll in an outpatient treatment program.                                  Intervention/Education provided during outreach: RN Clinical Product Navigator advised patient to contact [facility] in the next week to inquire about next steps for enrolling in an outpatient treatment program.  Patient agreeable to plan.     Outreach Frequency: monthly        Plan:   Patient will outreach to [facility] to inquire about next steps to enroll in an outpatient treatment program.  2. RN Clinical Product Navigator will follow up in 1-2 weeks.    Melissa Behl BSN,  RN, PHN, [facility]  RN Clinical Product Navigator  [phone]

## 2023-07-20 ENCOUNTER — PATIENT OUTREACH (OUTPATIENT)
Dept: CARE COORDINATION | Facility: CLINIC | Age: 23
End: 2023-07-20

## 2023-07-20 NOTE — PROGRESS NOTES
Clinic Care Coordination Contact    RN Clinical Product Navigator contacted patient for follow-up.  Patient informed writer he had not had an opportunity to contact [facility] [facility] for outpatient treatment options due to his work scheduled.  Patient states he will attempt to contact them today and requested writer to contact him tomorrow.    Melissa Behl BSN, RN, PHN, CCM  RN Clinical Product Navigator  [phone]

## 2023-07-20 NOTE — PROGRESS NOTES
Clinic Care Coordination Contact  [facility]/Voicemail       Clinical Data: Care Coordinator Outreach  Outreach attempted x 2.  Left message on patient's voicemail with call back information and requested return call.  Plan:  Care Coordinator will do no further outreaches at this time.    Kimmie OVIEDO Community Health Worker  Clinic Care Coordination  [facility]  Phone: [phone]       details

## 2023-07-21 NOTE — PROGRESS NOTES
Clinic Care Coordination Contact  [facility]/Voicemail       Clinical Data: Care Coordinator Outreach  Outreach attempted x 1.  No voicemail available.  Plan: Care Coordinator will try to reach patient again in approximately 10 business days.    Melissa Behl BSN, RN, PHN, [facility]  RN Clinical Product Navigator  [phone]

## 2023-08-04 ENCOUNTER — PATIENT OUTREACH (OUTPATIENT)
Dept: CARE COORDINATION | Facility: CLINIC | Age: 23
End: 2023-08-04

## 2023-08-04 NOTE — PROGRESS NOTES
Clinic Care Coordination Contact  Follow Up Progress Note      Assessment: RN Clinical Product Navigator spoke with patient for follow-up.  Patient informed writer he was able to establish with outpatient treatment through [facility] starting 8/7/23.  Patient states he also needed to obtain new health insurance due to making too much money to qualify for [facility].  Patient will call to reschedule his preventative care exam once he completes outpatient therapy.  No other needs at this time.    Care Gaps:    Health Maintenance Due   Topic Date Due    COVID-19 Vaccine (1) Never done    Pneumococcal Vaccine: Pediatrics (0 to 5 Years) and At-Risk Patients (6 to 64 Years) (1 - PCV) 09/08/2008    HPV IMMUNIZATION (1 - Male 2-dose series) Never done    YEARLY PREVENTIVE VISIT  02/18/2020    HEPATITIS C SCREENING  Never done    ASTHMA ACTION PLAN  01/21/2021    DAVIAN ASSESSMENT  03/15/2023    ASTHMA CONTROL TEST  03/15/2023    PHQ-9  03/15/2023       Patient accepted scheduling phone number for [phone]  to schedule independently     Care Plans  Care Plan: Substance Use       Problem: Substance Use       Goal: Improve substance use status; get into outpatient treatment program  Completed 8/4/2023      This Visit's Progress: 100% Recent Progress: 10%    Note:     Barriers: work schedule   Strengths: motivated  Patient expressed understanding of goal: yes  Action steps to achieve this goal:  1. I will work with [facility]  on Outpatient Treatment Program placement.  2. I will attend my preventative care visit with Dr. Denton.  3. I will call [facility] - Phone: [phone] and [facility] Phone: [phone] to request a Rule 25 assessment. Complete.  4. I will enroll in an outpatient treatment program.                                  Intervention/Education provided during outreach: RN Clinical Product Navigator reviewed importance of scheduling preventative care exam.               Plan:   Patient will participate in outpatient treatment program.  Patient will schedule preventative health exam once outpatient treatment program is complete.  No further needs from RN Clinical Product Navigatort a this time; patient will outreach if future needs arise.    Melissa Behl BSN, RN, PHN, [facility]  RN Clinical Product Navigator  [phone]
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
## 2024-06-30 ENCOUNTER — HEALTH MAINTENANCE LETTER (OUTPATIENT)
Age: 24
End: 2024-06-30

## 2024-07-08 ENCOUNTER — APPOINTMENT (OUTPATIENT)
Dept: CT IMAGING | Facility: CLINIC | Age: 24
End: 2024-07-08
Attending: EMERGENCY MEDICINE
Payer: COMMERCIAL

## 2024-07-08 ENCOUNTER — HOSPITAL ENCOUNTER (EMERGENCY)
Facility: CLINIC | Age: 24
Discharge: HOME | End: 2024-07-08
Attending: EMERGENCY MEDICINE | Admitting: EMERGENCY MEDICINE
Payer: COMMERCIAL

## 2024-07-08 VITALS
OXYGEN SATURATION: 99 % | TEMPERATURE: 99.5 F | WEIGHT: 130 LBS | SYSTOLIC BLOOD PRESSURE: 121 MMHG | BODY MASS INDEX: 21.63 KG/M2 | DIASTOLIC BLOOD PRESSURE: 73 MMHG | HEART RATE: 68 BPM | RESPIRATION RATE: 20 BRPM

## 2024-07-08 DIAGNOSIS — S00.432A CONTUSION OF LEFT EAR, INITIAL ENCOUNTER: ICD-10-CM

## 2024-07-08 PROCEDURE — 70486 CT MAXILLOFACIAL W/O DYE: CPT

## 2024-07-08 PROCEDURE — 99284 EMERGENCY DEPT VISIT MOD MDM: CPT | Mod: 25 | Performed by: EMERGENCY MEDICINE

## 2024-07-08 PROCEDURE — 250N000013 HC RX MED GY IP 250 OP 250 PS 637: Performed by: EMERGENCY MEDICINE

## 2024-07-08 PROCEDURE — 70450 CT HEAD/BRAIN W/O DYE: CPT

## 2024-07-08 PROCEDURE — 99284 EMERGENCY DEPT VISIT MOD MDM: CPT | Performed by: EMERGENCY MEDICINE

## 2024-07-08 PROCEDURE — 72125 CT NECK SPINE W/O DYE: CPT

## 2024-07-08 RX ORDER — HYDROCODONE BITARTRATE AND ACETAMINOPHEN 5; 325 MG/1; MG/1
1 TABLET ORAL EVERY 4 HOURS PRN
Status: DISCONTINUED | OUTPATIENT
Start: 2024-07-08 | End: 2024-07-09 | Stop reason: HOSPADM

## 2024-07-08 RX ADMIN — HYDROCODONE BITARTRATE AND ACETAMINOPHEN 1 TABLET: 5; 325 TABLET ORAL at 20:14

## 2024-07-08 ASSESSMENT — ACTIVITIES OF DAILY LIVING (ADL)
ADLS_ACUITY_SCORE: 33
ADLS_ACUITY_SCORE: 35

## 2024-07-08 ASSESSMENT — COLUMBIA-SUICIDE SEVERITY RATING SCALE - C-SSRS
1. IN THE PAST MONTH, HAVE YOU WISHED YOU WERE DEAD OR WISHED YOU COULD GO TO SLEEP AND NOT WAKE UP?: NO
2. HAVE YOU ACTUALLY HAD ANY THOUGHTS OF KILLING YOURSELF IN THE PAST MONTH?: NO
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
## 2024-07-09 NOTE — DISCHARGE INSTRUCTIONS
Ice to the area.  Tylenol up to 1000 mg 4 times a day may be used for pain.  Recommend some antibiotic ointment to the open areas.  Follow-up for any signs of infection or other concern.

## 2024-07-09 NOTE — ED NOTES
"Pt took C-collar off, states \"it hurts more than it helps\"    Pt states that he was seen in a ER in [place], only had XR of right femur. After that he states he left, \"I just wanted to get home. States he had \"suspected fractures of left ribs     Presents today with brusing and pain to left ear, difficulty chewing, pain to left ribs.     Has abrasion to left flank which is scabbed over. Healing bruising to left medial thigh and small abrasion to right shin  "
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
## 2024-09-17 NOTE — PROGRESS NOTES
History of Present Illness - Cathi Gu is a 18 year old male presenting in clinic today for a recheck on Patient presents with:  Ear Tube Follow Up    Overall the patient is doing well he is no complaints the ear pressure is gone he feels much much better he can hear better.      Body mass index is 17.81 kg/(m^2).        BP Readings from Last 1 Encounters:   10/30/18 106/58       BP noted to be well controlled today in office.     Cathi IS NOT a smoker/uses chewing tobacco.        Past Medical History -   Past Medical History:   Diagnosis Date     Asthma, intermittent     Triggers: URIs     Cyclical vomiting age 8y     Learning disability     IEP for reading and math     Von Willebrand disease (H) 2015       Current Medications -   Current Outpatient Prescriptions:      Acetaminophen (TYLENOL PO), Take 500 mg by mouth , Disp: , Rfl:      albuterol (PROAIR HFA/PROVENTIL HFA/VENTOLIN HFA) 108 (90 Base) MCG/ACT inhaler, Inhale 2 puffs into the lungs every 6 hours, Disp: 1 Inhaler, Rfl: 1     citalopram (CELEXA) 10 MG tablet, Take 1 tablet (10 mg) by mouth daily, Disp: 30 tablet, Rfl: 1     fish oil-omega-3 fatty acids 1000 MG capsule, Take 2 g by mouth daily, Disp: , Rfl:      fluticasone (FLONASE) 50 MCG/ACT spray, SPRAY ONE TO TWO SPRAYS IN EACH NOSTRIL EVERY DAY, Disp: 16 g, Rfl: 1     Ibuprofen (ADVIL PO), , Disp: , Rfl:      montelukast (SINGULAIR) 10 MG tablet, Take 1 tablet (10 mg) by mouth At Bedtime, Disp: 90 tablet, Rfl: 3     VITAMIN D, CHOLECALCIFEROL, PO, Take 1,000 Units by mouth daily, Disp: , Rfl:     Allergies -   Allergies   Allergen Reactions     Augmentin Rash     Penicillins Rash       Social History -   Social History     Social History     Marital status: Single     Spouse name: N/A     Number of children: N/A     Years of education: N/A     Social History Main Topics     Smoking status: Never Smoker     Smokeless tobacco: Never Used      Comment: no exposure     Alcohol use No     Drug  Quality 226: Preventive Care And Screening: Tobacco Use: Screening And Cessation Intervention: Patient screened for tobacco use and is an ex/non-smoker Name And Contact Information For Health Care Proxy: Rosa lianne [phone] use: No     Sexual activity: No     Other Topics Concern     None     Social History Narrative    Lives with mother and mother's boyfriend.  He will start 8th grade Fall . Had some trouble in 7th grade and had to do summer school. Enjoys hanging out with friends, playing video games.       Family History -   Family History   Problem Relation Age of Onset     Bipolar Disorder Other      Schizophrenia Other      Bipolar Disorder Maternal Aunt      Bipolar Disorder Maternal Grandmother      Diabetes Other      Maternal great grandfather     Other - See Comments Other      Lupus-- paternal side half brothers     Other - See Comments Other      paternal side half brother,  congenital syndrome at age 1y     Other - See Comments Mother      mother 5 ft 1in with menarche at age 15 years;  mother is adopted, her biological parents were related (parent-child)/Concern for genetic syndrome, never worked up fully. Born with 3 toes on each foot.     Other - See Comments Father      unsure height, 5 feet 7 in est     Ulcerative Colitis Other      Maternal great grandmother       Review of Systems - As per HPI and PMHx, otherwise review of system review of the head and neck negative.    Physical Exam  Pulse 107  Wt 48.5 kg (107 lb)  SpO2 100%  BMI 17.81 kg/m2  BMI: Body mass index is 17.81 kg/(m^2).    General - The patient is well nourished and well developed, and appears to have good nutritional status.  Alert and oriented to person and place, answers questions and cooperates with examination appropriately.    SKIN - No suspicious lesions or rashes.  Respiration - No respiratory distress.  Head and Face - Normocephalic and atraumatic, with no gross asymmetry noted of the contour of the facial features.  The facial nerve is intact, with strong symmetric movements.    Voice and Breathing - The patient was breathing comfortably without the use of accessory muscles. The patients voice was clear and strong, and had  Quality 130: Documentation Of Current Medications In The Medical Record: Current Medications Documented appropriate pitch and quality.    Ears - Bilateral pinna and EACs with normal appearing overlying skin. Tympanic membrane intact with good mobility on pneumatic otoscopy bilaterally. Bony landmarks of the ossicular chain are normal. The tympanic membranes are normal in appearance. No retraction, perforation, or masses.  No fluid or purulence was seen in the external canal or the middle ear.     Eyes - Extraocular movements intact.  Sclera were not icteric or injected, conjunctiva were pink and moist.    Mouth - Examination of the oral cavity showed pink, healthy oral mucosa. No lesions or ulcerations noted.  The tongue was mobile and midline, and the dentition were in good condition.      Throat - The walls of the oropharynx were smooth, pink, moist, symmetric, and had no lesions or ulcerations.  The tonsillar pillars and soft palate were symmetric. Tonsils are symmetric. The uvula was midline on elevation.    Neck - Normal midline excursion of the laryngotracheal complex during swallowing.  Full range of motion on passive movement.  Palpation of the occipital, submental, submandibular, internal jugular chain, and supraclavicular nodes did not demonstrate any abnormal lymph nodes or masses.  The carotid pulse was palpable bilaterally.  Palpation of the thyroid was soft and smooth, with no nodules or goiter appreciated.  The trachea was mobile and midline.    Nose - External contour is symmetric, no gross deflection or scars.  Nasal mucosa is pink and moist with no abnormal mucus.  The septum was midline and non-obstructive, turbinates of normal size and position.  No polyps, masses, or purulence noted on examination.    Neuro - Nonfocal neuro exam is normal, CN 2 through 12 intact, normal gait and muscle tone.      Performed in clinic today:  Audiologic Studies - An audiogram and tympanogram were performed today as part of the evaluation and personally reviewed. The tympanogram shows Type B curves on the right and  Detail Level: Detailed Type B curves on the left, with High canal volumes and middle ear pressures.  The audiogram showed Normal thresholds on the right and Normal thresholdson the left.        A/P - Cathi Gu is a 18 year old male Patient presents with:  Ear Tube Follow Up    Patient is doing well with the tubes tube still in good position we will see him back in about 6 months to see if they have extruded.    Cathi should follow up in 6 months.            Rick Watson MD           Quality 431: Preventive Care And Screening: Unhealthy Alcohol Use - Screening: Patient not identified as an unhealthy alcohol user when screened for unhealthy alcohol use using a systematic screening method

## 2024-10-28 NOTE — ED NOTES
Also has been taking mucinex for 2 days, coughing up green phlegm now.  Does not appear in any respiratory distress at triage  
Was seen Sunday in ED diagnosed with bronchitis, put on z-natan and has inhalers at home and mom says he is getting worse, needing inhaler every 2 hrs (has not had since 0600 today), clinic could not see today appt tomorrow with [facility].  Went to  this morning but was told they will not see him since he hasnt been on antibiotics for 2 full days or been back to clinic.  Mom upset at triage  
24

## 2025-07-13 ENCOUNTER — HEALTH MAINTENANCE LETTER (OUTPATIENT)
Age: 25
End: 2025-07-13

## (undated) DEVICE — PACK MYRINGOTOMY CUSTOM

## (undated) RX ORDER — ONDANSETRON 2 MG/ML
INJECTION INTRAMUSCULAR; INTRAVENOUS
Status: DISPENSED
Start: 2018-09-11

## (undated) RX ORDER — PROPOFOL 10 MG/ML
INJECTION, EMULSION INTRAVENOUS
Status: DISPENSED
Start: 2018-09-11

## (undated) RX ORDER — LIDOCAINE HYDROCHLORIDE 20 MG/ML
INJECTION, SOLUTION EPIDURAL; INFILTRATION; INTRACAUDAL; PERINEURAL
Status: DISPENSED
Start: 2018-09-11

## (undated) RX ORDER — FENTANYL CITRATE 50 UG/ML
INJECTION, SOLUTION INTRAMUSCULAR; INTRAVENOUS
Status: DISPENSED
Start: 2018-09-11

## (undated) RX ORDER — LIDOCAINE HYDROCHLORIDE 10 MG/ML
INJECTION, SOLUTION EPIDURAL; INFILTRATION; INTRACAUDAL; PERINEURAL
Status: DISPENSED
Start: 2018-09-11

## (undated) RX ORDER — DEXAMETHASONE SODIUM PHOSPHATE 10 MG/ML
INJECTION INTRAMUSCULAR; INTRAVENOUS
Status: DISPENSED
Start: 2018-09-11